# Patient Record
Sex: MALE | Race: WHITE | NOT HISPANIC OR LATINO | Employment: UNEMPLOYED | URBAN - METROPOLITAN AREA
[De-identification: names, ages, dates, MRNs, and addresses within clinical notes are randomized per-mention and may not be internally consistent; named-entity substitution may affect disease eponyms.]

---

## 2017-09-15 ENCOUNTER — TRANSCRIBE ORDERS (OUTPATIENT)
Dept: ADMINISTRATIVE | Facility: HOSPITAL | Age: 62
End: 2017-09-15

## 2017-09-15 DIAGNOSIS — M25.511 RIGHT SHOULDER PAIN, UNSPECIFIED CHRONICITY: Primary | ICD-10-CM

## 2017-09-22 ENCOUNTER — APPOINTMENT (OUTPATIENT)
Dept: GENERAL RADIOLOGY | Facility: HOSPITAL | Age: 62
End: 2017-09-22

## 2017-09-27 ENCOUNTER — APPOINTMENT (OUTPATIENT)
Dept: MRI IMAGING | Facility: HOSPITAL | Age: 62
End: 2017-09-27

## 2017-10-27 ENCOUNTER — HOSPITAL ENCOUNTER (OUTPATIENT)
Dept: MRI IMAGING | Facility: HOSPITAL | Age: 62
Discharge: HOME | End: 2017-10-27

## 2017-10-27 ENCOUNTER — HOSPITAL ENCOUNTER (OUTPATIENT)
Dept: GENERAL RADIOLOGY | Facility: HOSPITAL | Age: 62
Discharge: HOME | End: 2017-10-27
Admitting: PHYSICAL MEDICINE & REHABILITATION

## 2017-10-27 DIAGNOSIS — M25.511 RIGHT SHOULDER PAIN, UNSPECIFIED CHRONICITY: ICD-10-CM

## 2017-10-27 PROCEDURE — 77002 NEEDLE LOCALIZATION BY XRAY: CPT

## 2017-10-27 PROCEDURE — A9577 INJ MULTIHANCE: HCPCS | Performed by: PHYSICIAN ASSISTANT

## 2017-10-27 PROCEDURE — 0 GADOBENATE DIMEGLUMINE 529 MG/ML SOLUTION: Performed by: PHYSICIAN ASSISTANT

## 2017-10-27 PROCEDURE — 73222 MRI JOINT UPR EXTREM W/DYE: CPT

## 2017-10-27 PROCEDURE — 0 IOPAMIDOL 61 % SOLUTION: Performed by: PHYSICIAN ASSISTANT

## 2017-10-27 RX ORDER — LIDOCAINE HYDROCHLORIDE 10 MG/ML
5 INJECTION, SOLUTION INFILTRATION; PERINEURAL ONCE
Status: COMPLETED | OUTPATIENT
Start: 2017-10-27 | End: 2017-10-27

## 2017-10-27 RX ADMIN — GADOBENATE DIMEGLUMINE 1 ML: 529 INJECTION, SOLUTION INTRAVENOUS at 14:55

## 2017-10-27 RX ADMIN — LIDOCAINE HYDROCHLORIDE 5 ML: 10 INJECTION, SOLUTION INFILTRATION; PERINEURAL at 14:55

## 2017-10-27 RX ADMIN — IOPAMIDOL 10 ML: 612 INJECTION, SOLUTION INTRAVENOUS at 14:55

## 2017-11-13 ENCOUNTER — OFFICE VISIT (OUTPATIENT)
Dept: ORTHOPEDIC SURGERY | Facility: CLINIC | Age: 62
End: 2017-11-13

## 2017-11-13 VITALS
HEIGHT: 69 IN | BODY MASS INDEX: 36.29 KG/M2 | DIASTOLIC BLOOD PRESSURE: 89 MMHG | WEIGHT: 245 LBS | HEART RATE: 84 BPM | SYSTOLIC BLOOD PRESSURE: 105 MMHG

## 2017-11-13 DIAGNOSIS — S46.011A RUPTURE OF RIGHT SUPRASPINATUS TENDON, INITIAL ENCOUNTER: Primary | ICD-10-CM

## 2017-11-13 DIAGNOSIS — S43.431A GLENOID LABRUM TEAR, RIGHT, INITIAL ENCOUNTER: ICD-10-CM

## 2017-11-13 DIAGNOSIS — S43.431A SUPERIOR GLENOID LABRUM LESION OF RIGHT SHOULDER, INITIAL ENCOUNTER: ICD-10-CM

## 2017-11-13 PROCEDURE — 99204 OFFICE O/P NEW MOD 45 MIN: CPT | Performed by: ORTHOPAEDIC SURGERY

## 2017-11-13 RX ORDER — OMEPRAZOLE 40 MG/1
CAPSULE, DELAYED RELEASE ORAL
Refills: 0 | COMMUNITY
Start: 2017-10-23 | End: 2022-10-28

## 2017-11-13 RX ORDER — LORATADINE 10 MG/1
TABLET ORAL
Refills: 1 | COMMUNITY
Start: 2017-09-27 | End: 2022-10-28

## 2017-11-13 RX ORDER — CITALOPRAM 20 MG/1
20 TABLET ORAL DAILY
Refills: 0 | COMMUNITY
Start: 2017-11-06

## 2017-11-13 NOTE — PROGRESS NOTES
[facility] Note    Subjective     Chief Complaint   Patient presents with   • Right Shoulder - Pain        HPI      Bolivar Caldwell is a 62 y.o. male.  The patient injured his shoulder 2 months ago.  This is a worker's comp injury.  This pain is worse with motion and better with rest.  Pain is 3-7 out of 10.  It is dull.        History reviewed. No pertinent past medical history.   Past Surgical History:   Procedure Laterality Date   • FOOT SURGERY Left    • KNEE SURGERY Bilateral    • MASTOID SURGERY Right    • SHOULDER SURGERY Right       Family History   Problem Relation Age of Onset   • Cancer Father      Social History     Social History   • Marital status:      Spouse name: N/A   • Number of children: N/A   • Years of education: N/A     Occupational History   • Not on file.     Social History Main Topics   • Smoking status: Never Smoker   • Smokeless tobacco: Never Used   • Alcohol use Yes      Comment: occasional   • Drug use: No   • Sexual activity: Defer     Other Topics Concern   • Not on file     Social History Narrative   • No narrative on file      No current outpatient prescriptions on file prior to visit.     No current facility-administered medications on file prior to visit.       Allergies   Allergen Reactions   • Nubain [Nalbuphine]      Dry heave &  passes out        The following portions of the patient's history were reviewed and updated as appropriate: allergies, current medications, past family history, past medical history, past social history, past surgical history and problem list.    Review of Systems   Constitutional: Negative.    HENT: Negative.    Eyes: Negative.    Respiratory: Positive for apnea and choking.    Cardiovascular: Positive for leg swelling.   Gastrointestinal: Negative.         Acid reflux- taking meds    Endocrine: Negative.    Genitourinary: Negative.    Musculoskeletal: Positive for arthralgias, back pain and neck stiffness.   Skin:  "Negative.    Allergic/Immunologic: Positive for environmental allergies.   Neurological: Negative.    Hematological: Negative.    Psychiatric/Behavioral: Negative.         Objective      Physical Exam  /89  Pulse 84  Ht 68.5\" (174 cm)  Wt 245 lb (111 kg)  BMI 36.71 kg/m2    Body mass index is 36.71 kg/(m^2).        GENERAL APPEARANCE: awake, alert & oriented x 3, in no acute distress and well developed, well nourished  PSYCH: normal mood andaffect  LUNGS:  breathing nonlabored, no wheezing  EYES: sclera anicteric, pupils equal  CARDIOVASCULAR: palpable pulses dorsalis pedis, palpable posterior tibial bilaterally. Capillary refill less than 2 seconds  INTEGUMENTARY: skin intact, no clubbing, cyanosis  NEUROLOGIC:  Normal gait and balance            Ortho Exam  Musculoskeletal   Upper Extremity   Right Shoulder     Inspection and Palpation:     Tenderness - none    Crepitus - none    Sensation is normal    Examination reveals no ecchymosis.      Strength and Tone:    Supraspinatus - 5/5    Infraspinatus - 5/5    Subscapularis - 5/5    Deltoid - 5/5     Range of Motion   Left shoulder:    Internal Rotation: ROM - T7    External Rotation: AROM - 80 degrees    Elevation through flexion: AROM - 180 degrees    Right Shoulder:    Internal Rotation: ROM - T7    External Rotation: AROM - 80 degrees    Elevation through flexion: AROM - 180 degrees     Abduction -180 degrees     Instability   Right shoulder    Sulcus sign negative    Apprehension test negative    Francois relocation test negative    Jerk test negative   Impingement   Right shoulder    Dorado impingement test positive    Neer impingement test positive   Functional Testing   Right shoulder    AC crossover adduction test negative    Abdominal compression test negative    Lift-off sign negative    Speed's test negative    Castañeda's test negative    Horriblower's sign negative       Imaging/Studies  Imaging Results (last 24 hours)     ** No results found for " the last 24 hours. **        I reviewed his MRI right shoulder shows a full-thickness supraspinatus tear partial infraspinatus tear and he has a pan labral tear superior labrum anterior and posterior  Assessment/Plan      Bolivar was seen today for pain.    Diagnoses and all orders for this visit:    Rupture of right supraspinatus tendon, initial encounter    Superior glenoid labrum lesion of right shoulder, initial encounter    Glenoid labrum tear, right, initial encounter      I recommend right shoulder arthroscopy with rotator cuff repair and labrum repair.Treatment options and alternatives were discussed with patient.  Surgical versus non surgical treatment options were discussed as well.    We reviewed the nature of the planned procedure including the risks, benefits and potential complications.  Understanding was expressed and the decision was made to proceed with surgery.      Medical Decision Making  Management Options : over-the-counter medicine and major surgery with risk factors  Data/Risk: radiology tests and independent visualization of imaging, lab tests, or EMG/NCV    Valente Conde MD  11/13/17  8:28 AM

## 2017-12-12 ENCOUNTER — OUTSIDE FACILITY SERVICE (OUTPATIENT)
Dept: ORTHOPEDIC SURGERY | Facility: CLINIC | Age: 62
End: 2017-12-12

## 2017-12-12 PROCEDURE — 29823 SHO ARTHRS SRG XTNSV DBRDMT: CPT | Performed by: ORTHOPAEDIC SURGERY

## 2017-12-12 PROCEDURE — 29827 SHO ARTHRS SRG RT8TR CUF RPR: CPT | Performed by: ORTHOPAEDIC SURGERY

## 2017-12-20 ENCOUNTER — OFFICE VISIT (OUTPATIENT)
Dept: ORTHOPEDIC SURGERY | Facility: CLINIC | Age: 62
End: 2017-12-20

## 2017-12-20 DIAGNOSIS — S46.011D RUPTURE OF RIGHT SUPRASPINATUS TENDON, SUBSEQUENT ENCOUNTER: Primary | ICD-10-CM

## 2017-12-20 DIAGNOSIS — S43.431D SUPERIOR GLENOID LABRUM LESION OF RIGHT SHOULDER, SUBSEQUENT ENCOUNTER: ICD-10-CM

## 2017-12-20 DIAGNOSIS — Z98.890 STATUS POST ARTHROSCOPY OF SHOULDER: ICD-10-CM

## 2017-12-20 PROCEDURE — 99024 POSTOP FOLLOW-UP VISIT: CPT | Performed by: ORTHOPAEDIC SURGERY

## 2017-12-20 RX ORDER — ACYCLOVIR 400 MG/1
TABLET ORAL
COMMUNITY
Start: 2017-11-30 | End: 2022-10-28

## 2017-12-20 NOTE — PROGRESS NOTES
Chief Complaint   Patient presents with   • Post-op     1 week- Rt shoulder arthroscopy with RCR           HPI  His complaint of nausea from the pain medicine and hallucinationsand he also has a sore throat      There were no vitals filed for this visit.      Physical Exam:    The shoulder portals look good he is neurovascular intact        Bolivar was seen today for post-op.    Diagnoses and all orders for this visit:    Rupture of right supraspinatus tendon, subsequent encounter    Superior glenoid labrum lesion of right shoulder, subsequent encounter    Status post arthroscopy of shoulder      He is seen a dentist today for his throat and bite alignment.  He'll follow up in 3 weeks and start physical therapy.  He will return to work January 3 no use right arm

## 2018-01-10 ENCOUNTER — OFFICE VISIT (OUTPATIENT)
Dept: ORTHOPEDIC SURGERY | Facility: CLINIC | Age: 63
End: 2018-01-10

## 2018-01-10 DIAGNOSIS — S46.011D RUPTURE OF RIGHT SUPRASPINATUS TENDON, SUBSEQUENT ENCOUNTER: Primary | ICD-10-CM

## 2018-01-10 DIAGNOSIS — Z98.890 STATUS POST ARTHROSCOPY OF SHOULDER: ICD-10-CM

## 2018-01-10 PROCEDURE — 99024 POSTOP FOLLOW-UP VISIT: CPT | Performed by: ORTHOPAEDIC SURGERY

## 2018-01-10 NOTE — PROGRESS NOTES
Chief Complaint   Patient presents with   • Post-op Follow-up     Debridement of anterior and posterior and superior labral tears   • Post-op     4 weeks , 12/12/17 Right shoulder arthrosopy with rotator cuff repair of the supraspinatus using Arthrex SpeedBridge           HPI  He is doing well no complaints.      There were no vitals filed for this visit.      Physical Exam:  His right shoulder looks good no sign of infection and neurovascular intact        Bolivar was seen today for post-op follow-up and post-op.    Diagnoses and all orders for this visit:    Rupture of right supraspinatus tendon, subsequent encounter  -     Ambulatory Referral to Physical Therapy    Status post arthroscopy of shoulder  -     Ambulatory Referral to Physical Therapy    He will start physical therapy and follow-up in one month.  He is on restrictions of no use right arm

## 2018-01-16 ENCOUNTER — TREATMENT (OUTPATIENT)
Dept: PHYSICAL THERAPY | Facility: CLINIC | Age: 63
End: 2018-01-16

## 2018-01-16 DIAGNOSIS — S46.011D RUPTURE OF RIGHT SUPRASPINATUS TENDON, SUBSEQUENT ENCOUNTER: ICD-10-CM

## 2018-01-16 DIAGNOSIS — Z98.890 STATUS POST ARTHROSCOPY OF SHOULDER: Primary | ICD-10-CM

## 2018-01-16 PROCEDURE — 97140 MANUAL THERAPY 1/> REGIONS: CPT | Performed by: PHYSICAL THERAPIST

## 2018-01-16 PROCEDURE — 97110 THERAPEUTIC EXERCISES: CPT | Performed by: PHYSICAL THERAPIST

## 2018-01-16 PROCEDURE — 97001 PR PHYS THERAPY EVALUATION: CPT | Performed by: PHYSICAL THERAPIST
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
## 2018-02-07 ENCOUNTER — TREATMENT (OUTPATIENT)
Dept: PHYSICAL THERAPY | Facility: CLINIC | Age: 63
End: 2018-02-07

## 2018-02-07 DIAGNOSIS — Z98.890 STATUS POST ARTHROSCOPY OF SHOULDER: Primary | ICD-10-CM

## 2018-02-07 PROCEDURE — 97110 THERAPEUTIC EXERCISES: CPT | Performed by: PHYSICAL THERAPIST

## 2018-02-07 PROCEDURE — 97140 MANUAL THERAPY 1/> REGIONS: CPT | Performed by: PHYSICAL THERAPIST

## 2018-02-07 NOTE — PROGRESS NOTES
Physical Therapy Daily Progress Note    TOTAL TIME:  55 MINUTES    Bolivar Caldwell reports: patient returns today for the first time since 1/16 initial evaluation; he has a sick family member out of state that he has been visiting, but has been doing his HEP at home;      Objective   See Exercise, Manual, and Modality Logs for complete treatment.     THERAPEUTIC EXERCISES/ACTIVITIES ADDED TODAY: see flow sheets    Assessment/Plan GOOD PROM TODAY; ENCOURAGED PATIENT TO AVOID EXCESSIVE AROM OR LIFTING AT THIS TIME SECONDARY TO PROTOCOL DURING THE PROTECTIVE PHASE; PATIENT IS TYPE A PERSONALITY AND SHOULD TAKE CARE AND CAUTION TO OVERDO IT;   PATIENT NEEDS CONTINUED PHYSICAL THERAPY IN ORDER TO ACHIEVE FULL ROM, STRENGTH AND FUNCTION WITH NO REPORTS OF PAIN IN ORDER TO RTW WITHOUT RESTRICTIONS AND WITHOUT PAIN OR DYSFUNCTION       Progress per Plan of Care and Progress strengthening /stabilization /functional activity           Manual Therapy:    25     mins  81742;  Therapeutic Exercise:    15     mins  72506;     Neuromuscular Noah:        mins  19630;    Therapeutic Activity:          mins  60512;     Gait Training:           mins  26068;     Ultrasound:          mins  58830;    Electrical Stimulation:         mins  73206 ( );  Dry Needling          mins self-pay    Timed Treatment:   40   mins   Total Treatment:     55   mins    Be Hodgson, PT  Physical Therapist

## 2018-02-09 ENCOUNTER — OFFICE VISIT (OUTPATIENT)
Dept: ORTHOPEDIC SURGERY | Facility: CLINIC | Age: 63
End: 2018-02-09

## 2018-02-09 DIAGNOSIS — S46.011D RUPTURE OF RIGHT SUPRASPINATUS TENDON, SUBSEQUENT ENCOUNTER: ICD-10-CM

## 2018-02-09 DIAGNOSIS — Z98.890 STATUS POST ARTHROSCOPY OF SHOULDER: Primary | ICD-10-CM

## 2018-02-09 PROCEDURE — 99024 POSTOP FOLLOW-UP VISIT: CPT | Performed by: ORTHOPAEDIC SURGERY

## 2018-02-09 NOTE — PROGRESS NOTES
Chief Complaint   Patient presents with   • Right Shoulder - Follow-up     1 month f/u  2 month post 12/12/17 Right shoulder arthrosopy with rotator cuff repair of the supraspinatus using Arthrex SpeedBridge           HPI    Is doing great with no complaints other than occasional pain.  Last lites first time he had take a pain pill.  He has full motion with 4 out of 5 strength.  He is neurovascular intact.    There were no vitals filed for this visit.      Physical Exam:        Bolivar was seen today for follow-up.    Diagnoses and all orders for this visit:    Status post arthroscopy of shoulder  -     Ambulatory Referral to Physical Therapy    Rupture of right supraspinatus tendon, subsequent encounter  -     Ambulatory Referral to Physical Therapy    He will continue physical therapy and start strengthening March 12.  He is work restriction no lifting right arm.

## 2018-02-13 ENCOUNTER — TREATMENT (OUTPATIENT)
Dept: PHYSICAL THERAPY | Facility: CLINIC | Age: 63
End: 2018-02-13

## 2018-02-13 DIAGNOSIS — Z98.890 STATUS POST ARTHROSCOPY OF SHOULDER: Primary | ICD-10-CM

## 2018-02-13 PROCEDURE — 97110 THERAPEUTIC EXERCISES: CPT | Performed by: PHYSICAL THERAPIST

## 2018-02-13 PROCEDURE — 97140 MANUAL THERAPY 1/> REGIONS: CPT | Performed by: PHYSICAL THERAPIST

## 2018-02-13 NOTE — PROGRESS NOTES
Physical Therapy Daily Progress Note    TOTAL TIME: 75 MINUTES    Bolivar Caldwell reports: shoulder is feeling good, a little tight at times; saw Dr. Conde last week and he said everything is looking good      Objective   See Exercise, Manual, and Modality Logs for complete treatment.     THERAPEUTIC EXERCISES/ACTIVITIES ADDED TODAY: shoulder isometrics for flexion, ER, ADD and IR    Assessment/Plan  PATIENT NEEDS CONTINUED PHYSICAL THERAPY IN ORDER TO ACHIEVE FULL ROM, STRENGTH AND FUNCTION WITH NO REPORTS OF PAIN IN ORDER TO RTW WITHOUT RESTRICTIONS AND WITHOUT PAIN OR DYSFUNCTION       Progress per Plan of Care           Manual Therapy:    15     mins  28184;  Therapeutic Exercise:    50     mins  56696;     Neuromuscular Noah:        mins  89480;    Therapeutic Activity:          mins  00251;     Gait Training:           mins  76314;     Ultrasound:          mins  63294;    Electrical Stimulation:         mins  89130 ( );  Dry Needling          mins self-pay    Timed Treatment:   65   mins   Total Treatment:     75   mins    Be Hodgson, PT  Physical Therapist

## 2018-02-15 ENCOUNTER — TREATMENT (OUTPATIENT)
Dept: PHYSICAL THERAPY | Facility: CLINIC | Age: 63
End: 2018-02-15

## 2018-02-15 DIAGNOSIS — Z98.890 STATUS POST ARTHROSCOPY OF SHOULDER: Primary | ICD-10-CM

## 2018-02-15 PROCEDURE — 97140 MANUAL THERAPY 1/> REGIONS: CPT | Performed by: PHYSICAL THERAPIST

## 2018-02-15 PROCEDURE — 97110 THERAPEUTIC EXERCISES: CPT | Performed by: PHYSICAL THERAPIST

## 2018-02-15 NOTE — PROGRESS NOTES
Physical Therapy Daily Progress Note    TOTAL TIME: 60 MINUTES    Bolivar Caldwell reports: no new complaints; still pretty sore at night when trying to sleep      Objective   See Exercise, Manual, and Modality Logs for complete treatment.     THERAPEUTIC EXERCISES/ACTIVITIES ADDED TODAY: UBE    Assessment/Plan  PATIENT NEEDS CONTINUED PHYSICAL THERAPY IN ORDER TO ACHIEVE FULL ROM, STRENGTH AND FUNCTION WITH NO REPORTS OF PAIN IN ORDER TO RTW WITHOUT RESTRICTIONS AND WITHOUT PAIN OR DYSFUNCTION       Progress per Plan of Care and Progress strengthening /stabilization /functional activity           Manual Therapy:    15     mins  56545;  Therapeutic Exercise:    45     mins  48672;     Neuromuscular Noah:        mins  50745;    Therapeutic Activity:          mins  90548;     Gait Training:           mins  83915;     Ultrasound:          mins  52807;    Electrical Stimulation:         mins  42107 ( );  Dry Needling          mins self-pay    Timed Treatment:   60   mins   Total Treatment:     60   mins    Be Hodgson, PT  Physical Therapist

## 2018-02-19 ENCOUNTER — TREATMENT (OUTPATIENT)
Dept: PHYSICAL THERAPY | Facility: CLINIC | Age: 63
End: 2018-02-19

## 2018-02-19 DIAGNOSIS — Z98.890 STATUS POST ARTHROSCOPY OF SHOULDER: Primary | ICD-10-CM

## 2018-02-19 PROCEDURE — 97140 MANUAL THERAPY 1/> REGIONS: CPT | Performed by: PHYSICAL THERAPIST

## 2018-02-19 PROCEDURE — 97110 THERAPEUTIC EXERCISES: CPT | Performed by: PHYSICAL THERAPIST

## 2018-02-19 NOTE — PROGRESS NOTES
Physical Therapy Daily Progress Note    TOTAL TIME: 65 MINUTES    Bolivar Caldwell reports: shoulder sore at night; able to do a little more with it      Objective   See Exercise, Manual, and Modality Logs for complete treatment.     THERAPEUTIC EXERCISES/ACTIVITIES ADDED TODAY: light IR stx    Assessment/Plan  PATIENT NEEDS CONTINUED PHYSICAL THERAPY IN ORDER TO ACHIEVE FULL ROM, STRENGTH AND FUNCTION WITH NO REPORTS OF PAIN IN ORDER TO RTW WITHOUT RESTRICTIONS AND WITHOUT PAIN OR DYSFUNCTION       Progress per Plan of Care           Manual Therapy:    25     mins  54482;  Therapeutic Exercise:    30     mins  47906;     Neuromuscular Noah:        mins  68179;    Therapeutic Activity:          mins  55727;     Gait Training:           mins  01922;     Ultrasound:          mins  29474;    Electrical Stimulation:         mins  77471 ( );  Dry Needling          mins self-pay    Timed Treatment:   55   mins   Total Treatment:     65   mins    Be Hodgson PT  Physical Therapist

## 2018-02-22 ENCOUNTER — TREATMENT (OUTPATIENT)
Dept: PHYSICAL THERAPY | Facility: CLINIC | Age: 63
End: 2018-02-22

## 2018-02-22 DIAGNOSIS — Z98.890 STATUS POST ARTHROSCOPY OF SHOULDER: Primary | ICD-10-CM

## 2018-02-22 PROCEDURE — 97140 MANUAL THERAPY 1/> REGIONS: CPT | Performed by: PHYSICAL THERAPIST

## 2018-02-22 PROCEDURE — 97110 THERAPEUTIC EXERCISES: CPT | Performed by: PHYSICAL THERAPIST

## 2018-02-22 NOTE — PROGRESS NOTES
Physical Therapy Daily Progress Note    TOTAL TIME: 75 MINUTES    Bolivar Rosenthalclaricecaroline reports: shoulder gets really sore at night but I have a very busy schedule, not resting it much at all      Objective   See Exercise, Manual, and Modality Logs for complete treatment.     THERAPEUTIC EXERCISES/ACTIVITIES ADDED TODAY: n/a    Assessment/Plan  PATIENT NEEDS CONTINUED PHYSICAL THERAPY IN ORDER TO ACHIEVE FULL ROM, STRENGTH AND FUNCTION WITH NO REPORTS OF PAIN IN ORDER TO RTW WITHOUT RESTRICTIONS AND WITHOUT PAIN OR DYSFUNCTION       Progress per Plan of Care and Progress strengthening /stabilization /functional activity           Manual Therapy:    20     mins  16491;  Therapeutic Exercise:    40     mins  36282;     Neuromuscular Noah:        mins  80056;    Therapeutic Activity:          mins  29580;     Gait Training:           mins  24777;     Ultrasound:          mins  85980;    Electrical Stimulation:         mins  54139 ( );  Dry Needling          mins self-pay    Timed Treatment:   60   mins   Total Treatment:     75   mins    Be Hodgson PT  Physical Therapist

## 2018-03-02 ENCOUNTER — TREATMENT (OUTPATIENT)
Dept: PHYSICAL THERAPY | Facility: CLINIC | Age: 63
End: 2018-03-02

## 2018-03-02 DIAGNOSIS — Z98.890 STATUS POST ARTHROSCOPY OF SHOULDER: Primary | ICD-10-CM

## 2018-03-02 PROCEDURE — 97140 MANUAL THERAPY 1/> REGIONS: CPT | Performed by: PHYSICAL THERAPIST

## 2018-03-02 PROCEDURE — 97110 THERAPEUTIC EXERCISES: CPT | Performed by: PHYSICAL THERAPIST

## 2018-03-02 NOTE — PROGRESS NOTES
Physical Therapy Daily Progress Note    Subjective   Bolivar Caldwell reports that he is feeling good, he doesn't really have any pain at rest, only when he moves into extremes of ROM.     Objective   See Exercise, Manual, and Modality Logs for complete treatment.       Assessment/Plan      Pt has very good PROM of the right shoulder. He was able to do all activities in the clinic without exacerbation of symptoms. Shoulder be appropriate for progression to strengthening next week.     Progress per Plan of Care and Progress strengthening /stabilization /functional activity           Manual Therapy:    20     mins  11293;  Therapeutic Exercise:    36     mins  31077;     Neuromuscular Noah:        mins  43560;    Therapeutic Activity:          mins  33966;     Gait Training:           mins  50721;     Ultrasound:          mins  92840;    Electrical Stimulation:         mins  22017 ( );  Dry Needling          mins self-pay    Timed Treatment:   56   mins   Total Treatment:     70   mins    Shane Charles PT  Physical Therapist

## 2018-03-05 ENCOUNTER — TREATMENT (OUTPATIENT)
Dept: PHYSICAL THERAPY | Facility: CLINIC | Age: 63
End: 2018-03-05

## 2018-03-05 DIAGNOSIS — Z98.890 STATUS POST ARTHROSCOPY OF SHOULDER: Primary | ICD-10-CM

## 2018-03-05 PROCEDURE — 97140 MANUAL THERAPY 1/> REGIONS: CPT | Performed by: PHYSICAL THERAPIST

## 2018-03-05 PROCEDURE — 97110 THERAPEUTIC EXERCISES: CPT | Performed by: PHYSICAL THERAPIST

## 2018-03-05 NOTE — PROGRESS NOTES
Physical Therapy Daily Progress Note    TOTAL TIME: 75 MINUTES    Bolivar Caldwell reports: minimal to no pain during the day, but have a lot of pain at night when trying to sleep      Objective   See Exercise, Manual, and Modality Logs for complete treatment.     THERAPEUTIC EXERCISES/ACTIVITIES ADDED TODAY: see flow sheets    Assessment/Plan  PATIENT NEEDS CONTINUED PHYSICAL THERAPY IN ORDER TO ACHIEVE FULL ROM, STRENGTH AND FUNCTION WITH NO REPORTS OF PAIN IN ORDER TO RTW WITHOUT RESTRICTIONS AND WITHOUT PAIN OR DYSFUNCTION       Progress per Plan of Care and Progress strengthening /stabilization /functional activity           Manual Therapy:    20     mins  12171;  Therapeutic Exercise:    40     mins  27881;     Neuromuscular Noah:        mins  68991;    Therapeutic Activity:          mins  82472;     Gait Training:           mins  36446;     Ultrasound:          mins  98136;    Electrical Stimulation:         mins  12653 ( );  Dry Needling          mins self-pay    Timed Treatment:   60   mins   Total Treatment:     75   mins    Be Hodgson PT  Physical Therapist

## 2018-03-08 ENCOUNTER — TREATMENT (OUTPATIENT)
Dept: PHYSICAL THERAPY | Facility: CLINIC | Age: 63
End: 2018-03-08

## 2018-03-08 DIAGNOSIS — Z98.890 STATUS POST ARTHROSCOPY OF SHOULDER: Primary | ICD-10-CM

## 2018-03-08 PROCEDURE — 97110 THERAPEUTIC EXERCISES: CPT | Performed by: PHYSICAL THERAPIST

## 2018-03-08 PROCEDURE — 97140 MANUAL THERAPY 1/> REGIONS: CPT | Performed by: PHYSICAL THERAPIST

## 2018-03-08 NOTE — PROGRESS NOTES
Physical Therapy Daily Progress Note    TOTAL TIME: 90 MINUTES    Bolivar Caldwell reports: shoulder has been feeling better, still sore at night      Objective   See Exercise, Manual, and Modality Logs for complete treatment.     THERAPEUTIC EXERCISES/ACTIVITIES ADDED TODAY: added dumb bell exercises per flow sheets    Assessment/Plan  PATIENT NEEDS CONTINUED PHYSICAL THERAPY IN ORDER TO ACHIEVE FULL ROM, STRENGTH AND FUNCTION WITH NO REPORTS OF PAIN IN ORDER TO RTW WITHOUT RESTRICTIONS AND WITHOUT PAIN OR DYSFUNCTION       Progress per Plan of Care and Progress strengthening /stabilization /functional activity           Manual Therapy:    20     mins  19275;  Therapeutic Exercise:    55     mins  60170;     Neuromuscular Noah:        mins  36227;    Therapeutic Activity:          mins  74530;     Gait Training:           mins  14579;     Ultrasound:          mins  76875;    Electrical Stimulation:         mins  04597 ( );  Dry Needling          mins self-pay    Timed Treatment:   75   mins   Total Treatment:     90   mins    Be Hodgson, PT  Physical Therapist

## 2018-03-12 ENCOUNTER — TREATMENT (OUTPATIENT)
Dept: PHYSICAL THERAPY | Facility: CLINIC | Age: 63
End: 2018-03-12

## 2018-03-12 DIAGNOSIS — Z98.890 STATUS POST ARTHROSCOPY OF SHOULDER: Primary | ICD-10-CM

## 2018-03-12 PROCEDURE — 97014 ELECTRIC STIMULATION THERAPY: CPT | Performed by: PHYSICAL THERAPIST

## 2018-03-12 PROCEDURE — 97110 THERAPEUTIC EXERCISES: CPT | Performed by: PHYSICAL THERAPIST

## 2018-03-12 NOTE — PROGRESS NOTES
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     THERAPEUTIC EXERCISES/ACTIVITIES ADDED TODAY: green tband pulls, ADD, flexion    Assessment/Plan  PATIENT NEEDS CONTINUED PHYSICAL THERAPY IN ORDER TO ACHIEVE FULL ROM, STRENGTH AND FUNCTION WITH NO REPORTS OF PAIN IN ORDER TO RTW WITHOUT RESTRICTIONS AND WITHOUT PAIN OR DYSFUNCTION       Progress per Plan of Care           Manual Therapy:    20     mins  37992;  Therapeutic Exercise:    45     mins  84453;     Neuromuscular Noah:        mins  84091;    Therapeutic Activity:          mins  72059;     Gait Training:           mins  27836;     Ultrasound:          mins  83665;    Electrical Stimulation:         mins  79555 ( );  Dry Needling          mins self-pay    Timed Treatment:   65   mins   Total Treatment:     80   mins    Be Hodgson, PT  Physical Therapist

## 2018-03-15 ENCOUNTER — TREATMENT (OUTPATIENT)
Dept: PHYSICAL THERAPY | Facility: CLINIC | Age: 63
End: 2018-03-15

## 2018-03-15 DIAGNOSIS — Z98.890 STATUS POST ARTHROSCOPY OF SHOULDER: Primary | ICD-10-CM

## 2018-03-15 PROCEDURE — 97140 MANUAL THERAPY 1/> REGIONS: CPT | Performed by: PHYSICAL THERAPIST

## 2018-03-15 PROCEDURE — 97110 THERAPEUTIC EXERCISES: CPT | Performed by: PHYSICAL THERAPIST

## 2018-03-15 NOTE — PROGRESS NOTES
"Physical Therapy Daily Progress Note    TOTAL TIME: 80 MINUTES    Bolivar Caldwell reports: shoulder is feeling really good; still pain at night when trying to sleep      Objective   See Exercise, Manual, and Modality Logs for complete treatment.     THERAPEUTIC EXERCISES/ACTIVITIES ADDED TODAY: see flow sheets    Assessment/Plan  Patient is progressing very well through RTC repair protocol; working very hard and is very consistent with therapy; continue to educate patient to not \"over-do it\" outside of therapy as he is admittedly very type A; PROM is coming along very nicely, can AROM through functional ROM without compensation    Progress per Plan of Care           Manual Therapy:    20     mins  27344;  Therapeutic Exercise:    45     mins  68140;     Neuromuscular Noah:        mins  59700;    Therapeutic Activity:          mins  84609;     Gait Training:           mins  47357;     Ultrasound:          mins  29747;    Electrical Stimulation:         mins  85296 ( );  Dry Needling          mins self-pay    Timed Treatment:   65   mins   Total Treatment:     80   mins    Be Hodgson, PT  Physical Therapist  "

## 2018-03-16 ENCOUNTER — OFFICE VISIT (OUTPATIENT)
Dept: ORTHOPEDIC SURGERY | Facility: CLINIC | Age: 63
End: 2018-03-16

## 2018-03-16 DIAGNOSIS — Z98.890 STATUS POST RIGHT ROTATOR CUFF REPAIR: Primary | ICD-10-CM

## 2018-03-16 PROCEDURE — 99024 POSTOP FOLLOW-UP VISIT: CPT | Performed by: ORTHOPAEDIC SURGERY

## 2018-03-16 NOTE — PROGRESS NOTES
Chief Complaint   Patient presents with   • Post-op     1 month: 12 weeks s/p Right shoulder arthrosopy with rotator cuff repair of the supraspinatus using Arthrex SpeedBridge 12/12/17           HPI    He is doing well with occasional night pain.  He is 3 months status post right shoulder cuff repair.    There were no vitals filed for this visit.      Physical Exam:    Has full range of motion but appropriate weakness.          ICD-10-CM ICD-9-CM   1. Status post right rotator cuff repair Z98.890 V45.89       He will continue physical therapy.  His work restriction is no lifting right arm.  He will follow-up in one month.

## 2018-03-20 ENCOUNTER — TREATMENT (OUTPATIENT)
Dept: PHYSICAL THERAPY | Facility: CLINIC | Age: 63
End: 2018-03-20

## 2018-03-20 DIAGNOSIS — Z98.890 STATUS POST ARTHROSCOPY OF SHOULDER: Primary | ICD-10-CM

## 2018-03-20 PROCEDURE — 97140 MANUAL THERAPY 1/> REGIONS: CPT | Performed by: PHYSICAL THERAPIST

## 2018-03-20 PROCEDURE — 97110 THERAPEUTIC EXERCISES: CPT | Performed by: PHYSICAL THERAPIST

## 2018-03-20 NOTE — PROGRESS NOTES
Physical Therapy Daily Progress Note    TOTAL TIME: 80 MINUTES    Bolivar Caldwell reports: saw Dr. Conde, he was pleased with progress to this point, said I just need to continue progressing with therapy      Objective   See Exercise, Manual, and Modality Logs for complete treatment.     THERAPEUTIC EXERCISES/ACTIVITIES ADDED TODAY: TRX rows, casting, slap shot    Assessment/Plan  PATIENT NEEDS CONTINUED PHYSICAL THERAPY IN ORDER TO ACHIEVE FULL ROM, STRENGTH AND FUNCTION WITH NO REPORTS OF PAIN IN ORDER TO RTW WITHOUT RESTRICTIONS AND WITHOUT PAIN OR DYSFUNCTION       Progress per Plan of Care           Manual Therapy:    20     mins  19193;  Therapeutic Exercise:    45     mins  73789;     Neuromuscular Noah:        mins  65501;    Therapeutic Activity:          mins  55075;     Gait Training:           mins  44689;     Ultrasound:          mins  91668;    Electrical Stimulation:         mins  61216 ( );  Dry Needling          mins self-pay    Timed Treatment:   65   mins   Total Treatment:     80   mins    Be Hodgson, PT  Physical Therapist

## 2018-03-22 ENCOUNTER — TREATMENT (OUTPATIENT)
Dept: PHYSICAL THERAPY | Facility: CLINIC | Age: 63
End: 2018-03-22

## 2018-03-22 DIAGNOSIS — Z98.890 STATUS POST ARTHROSCOPY OF SHOULDER: Primary | ICD-10-CM

## 2018-03-22 PROCEDURE — 97140 MANUAL THERAPY 1/> REGIONS: CPT | Performed by: PHYSICAL THERAPIST

## 2018-03-22 PROCEDURE — 97110 THERAPEUTIC EXERCISES: CPT | Performed by: PHYSICAL THERAPIST

## 2018-03-22 NOTE — PROGRESS NOTES
Physical Therapy Daily Progress Note    TOTAL TIME: 80 MINUTES    Bolivar Goodkarencaroline reports: shoulder feeling better, still sore at night      Objective   See Exercise, Manual, and Modality Logs for complete treatment.     THERAPEUTIC EXERCISES/ACTIVITIES ADDED TODAY: 2# scaption; 2# ball on wall taps    Assessment/Plan  PATIENT NEEDS CONTINUED PHYSICAL THERAPY IN ORDER TO ACHIEVE FULL ROM, STRENGTH AND FUNCTION WITH NO REPORTS OF PAIN IN ORDER TO RTW WITHOUT RESTRICTIONS AND WITHOUT PAIN OR DYSFUNCTION       Progress per Plan of Care           Manual Therapy:    20     mins  09039;  Therapeutic Exercise:    45     mins  50121;     Neuromuscular Noah:        mins  21764;    Therapeutic Activity:          mins  36973;     Gait Training:           mins  44943;     Ultrasound:          mins  31596;    Electrical Stimulation:         mins  05574 ( );  Dry Needling          mins self-pay    Timed Treatment:   65   mins   Total Treatment:     80   mins    Be Hodgson PT  Physical Therapist

## 2018-04-02 ENCOUNTER — TREATMENT (OUTPATIENT)
Dept: PHYSICAL THERAPY | Facility: CLINIC | Age: 63
End: 2018-04-02

## 2018-04-02 DIAGNOSIS — Z98.890 STATUS POST ARTHROSCOPY OF SHOULDER: Primary | ICD-10-CM

## 2018-04-02 PROCEDURE — 97014 ELECTRIC STIMULATION THERAPY: CPT | Performed by: PHYSICAL THERAPIST

## 2018-04-02 PROCEDURE — 97110 THERAPEUTIC EXERCISES: CPT | Performed by: PHYSICAL THERAPIST

## 2018-04-02 NOTE — PROGRESS NOTES
Physical Therapy Daily Progress Note    TOTAL TIME: 85 MINUTES    Bolivar Javi reports: shoulder has been a little sore, may have done too much with it over the weekend      Objective   See Exercise, Manual, and Modality Logs for complete treatment.     THERAPEUTIC EXERCISES/ACTIVITIES ADDED TODAY: blue tband pull aparts, TRX series, 2# scaption    Assessment/Plan  PATIENT NEEDS CONTINUED PHYSICAL THERAPY IN ORDER TO ACHIEVE FULL ROM, STRENGTH AND FUNCTION WITH NO REPORTS OF PAIN IN ORDER TO RTW WITHOUT RESTRICTIONS AND WITHOUT PAIN OR DYSFUNCTION       Progress per Plan of Care           Manual Therapy:    20     mins  03794;  Therapeutic Exercise:    65     mins  92785;     Neuromuscular Noah:        mins  20810;    Therapeutic Activity:          mins  71415;     Gait Training:           mins  35005;     Ultrasound:          mins  21565;    Electrical Stimulation:         mins  32307 ( );  Dry Needling          mins self-pay    Timed Treatment:   85   mins   Total Treatment:     85   mins    Be Hodgson, PT  Physical Therapist

## 2018-04-09 ENCOUNTER — TREATMENT (OUTPATIENT)
Dept: PHYSICAL THERAPY | Facility: CLINIC | Age: 63
End: 2018-04-09

## 2018-04-09 DIAGNOSIS — Z98.890 STATUS POST ARTHROSCOPY OF SHOULDER: Primary | ICD-10-CM

## 2018-04-09 PROCEDURE — 97110 THERAPEUTIC EXERCISES: CPT | Performed by: PHYSICAL THERAPIST

## 2018-04-09 PROCEDURE — 97140 MANUAL THERAPY 1/> REGIONS: CPT | Performed by: PHYSICAL THERAPIST

## 2018-04-09 NOTE — PROGRESS NOTES
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     THERAPEUTIC EXERCISES/ACTIVITIES ADDED TODAY: grey tband one arm row with rotation CKC, 1 arm row with one plate on Universal machine     Assessment/Plan  PATIENT NEEDS CONTINUED PHYSICAL THERAPY IN ORDER TO ACHIEVE FULL ROM, STRENGTH AND FUNCTION WITH NO REPORTS OF PAIN IN ORDER TO RTW WITHOUT RESTRICTIONS AND WITHOUT PAIN OR DYSFUNCTION       Progress per Plan of Care           Manual Therapy:    10     mins  15235;  Therapeutic Exercise:    45     mins  64186;     Neuromuscular Noah:        mins  66415;    Therapeutic Activity:          mins  19718;     Gait Training:           mins  49198;     Ultrasound:          mins  71731;    Electrical Stimulation:         mins  82594 ( );  Dry Needling          mins self-pay    Timed Treatment:   55   mins   Total Treatment:     55   mins    Be Hodgson PT  Physical Therapist

## 2018-04-13 ENCOUNTER — TREATMENT (OUTPATIENT)
Dept: PHYSICAL THERAPY | Facility: CLINIC | Age: 63
End: 2018-04-13

## 2018-04-13 DIAGNOSIS — Z98.890 STATUS POST ARTHROSCOPY OF SHOULDER: Primary | ICD-10-CM

## 2018-04-13 PROCEDURE — 97110 THERAPEUTIC EXERCISES: CPT | Performed by: PHYSICAL THERAPIST

## 2018-04-13 PROCEDURE — 97140 MANUAL THERAPY 1/> REGIONS: CPT | Performed by: PHYSICAL THERAPIST

## 2018-04-16 ENCOUNTER — TREATMENT (OUTPATIENT)
Dept: PHYSICAL THERAPY | Facility: CLINIC | Age: 63
End: 2018-04-16

## 2018-04-16 DIAGNOSIS — Z98.890 STATUS POST ARTHROSCOPY OF SHOULDER: Primary | ICD-10-CM

## 2018-04-16 PROCEDURE — 97140 MANUAL THERAPY 1/> REGIONS: CPT | Performed by: PHYSICAL THERAPIST

## 2018-04-16 PROCEDURE — 97110 THERAPEUTIC EXERCISES: CPT | Performed by: PHYSICAL THERAPIST

## 2018-04-16 NOTE — PROGRESS NOTES
Physical Therapy Daily Progress Note    TOTAL TIME: 75 MINUTES    Bolivar Javi reports: shoulder is feeling better and better; will f/u with the MD next week      Objective   See Exercise, Manual, and Modality Logs for complete treatment.     THERAPEUTIC EXERCISES/ACTIVITIES ADDED TODAY: see flow sheets    Assessment/Plan  PATIENT NEEDS CONTINUED PHYSICAL THERAPY IN ORDER TO ACHIEVE FULL ROM, STRENGTH AND FUNCTION WITH NO REPORTS OF PAIN IN ORDER TO RTW WITHOUT RESTRICTIONS AND WITHOUT PAIN OR DYSFUNCTION       Progress per Plan of Care           Manual Therapy:    15     mins  80514;  Therapeutic Exercise:    50     mins  83817;     Neuromuscular Noah:        mins  46323;    Therapeutic Activity:          mins  45582;     Gait Training:           mins  19580;     Ultrasound:          mins  53533;    Electrical Stimulation:         mins  53861 ( );  Dry Needling          mins self-pay    Timed Treatment:   65   mins   Total Treatment:     75   mins    Be Hodgson PT  Physical Therapist

## 2018-04-17 NOTE — PROGRESS NOTES
Physical Therapy Daily Progress Note    TOTAL TIME: 75 MINUTES    Bolivar Goodkarencaroline reports: shoulder is feeling a lot better as of late; still pain when sleeping at night, but overall much better; hoping to be released back to full work duty at next MD visit      Objective   See Exercise, Manual, and Modality Logs for complete treatment.     THERAPEUTIC EXERCISES/ACTIVITIES ADDED TODAY: see flow sheets    Assessment/Plan  PATIENT NEEDS CONTINUED PHYSICAL THERAPY IN ORDER TO ACHIEVE FULL ROM, STRENGTH AND FUNCTION WITH NO REPORTS OF PAIN IN ORDER TO RTW WITHOUT RESTRICTIONS AND WITHOUT PAIN OR DYSFUNCTION       Progress per Plan of Care and Progress strengthening /stabilization /functional activity           Manual Therapy:    10     mins  21952;  Therapeutic Exercise:    65     mins  34438;     Neuromuscular Noah:        mins  16258;    Therapeutic Activity:          mins  03292;     Gait Training:           mins  57871;     Ultrasound:          mins  41661;    Electrical Stimulation:         mins  20815 ( );  Dry Needling          mins self-pay    Timed Treatment:   75   mins   Total Treatment:     75   mins    Be Hodgson, PT  Physical Therapist

## 2018-04-20 ENCOUNTER — OFFICE VISIT (OUTPATIENT)
Dept: ORTHOPEDIC SURGERY | Facility: CLINIC | Age: 63
End: 2018-04-20

## 2018-04-20 VITALS
HEIGHT: 69 IN | DIASTOLIC BLOOD PRESSURE: 92 MMHG | SYSTOLIC BLOOD PRESSURE: 129 MMHG | HEART RATE: 77 BPM | BODY MASS INDEX: 36.24 KG/M2 | WEIGHT: 244.71 LBS

## 2018-04-20 DIAGNOSIS — Z98.890 STATUS POST RIGHT ROTATOR CUFF REPAIR: Primary | ICD-10-CM

## 2018-04-20 PROCEDURE — 99212 OFFICE O/P EST SF 10 MIN: CPT | Performed by: ORTHOPAEDIC SURGERY

## 2018-04-20 NOTE — PROGRESS NOTES
[facility]    Subjective     Chief Complaint   Patient presents with   • Right Shoulder - Follow-up     17 weeks s/p Right shoulder arthrosopy with rotator cuff repair of the supraspinatus using Arthrex SpeedBridge 12/12/17        Hospitals in [place]      Bolivar Caldwell is a 62 y.o. male.  He is 4 months follow-up right shoulder cuff repair doing well without complaint.        History reviewed. No pertinent past medical history.   Past Surgical History:   Procedure Laterality Date   • FOOT SURGERY Left    • KNEE SURGERY Bilateral    • MASTOID SURGERY Right    • SHOULDER SURGERY Right       Family History   Problem Relation Age of Onset   • Cancer Father      Social History     Social History   • Marital status:      Spouse name: N/A   • Number of children: N/A   • Years of education: N/A     Occupational History   • Not on file.     Social History Main Topics   • Smoking status: Never Smoker   • Smokeless tobacco: Never Used   • Alcohol use Yes      Comment: occasional   • Drug use: No   • Sexual activity: Defer     Other Topics Concern   • Not on file     Social History Narrative   • No narrative on file      Current Outpatient Prescriptions on File Prior to Visit   Medication Sig Dispense Refill   • acyclovir (ZOVIRAX) 400 MG tablet      • citalopram (CeleXA) 20 MG tablet   0   • loratadine (CLARITIN) 10 MG tablet take 1 tablet by mouth once daily if needed  1   • omeprazole (priLOSEC) 40 MG capsule   0   • [DISCONTINUED] oxyCODONE-acetaminophen (PERCOCET)  MG per tablet Take 1-2 tablets by mouth Every 6 (Six) Hours As Needed for pain. 40 tablet 0   • [DISCONTINUED] promethazine (PHENERGAN) 25 MG tablet Take 1 tablet by mouth Every 6 (Six) Hours As Needed for nausea. 20 tablet 0     No current facility-administered medications on file prior to visit.       Allergies   Allergen Reactions   • Nubain [Nalbuphine]      Dry heave &  passes out        The following portions of the  "patient's history were reviewed and updated as appropriate: allergies, current medications, past family history, past medical history, past social history, past surgical history and problem list.    Review of Systems   Constitutional: Negative.    HENT: Negative.    Eyes: Negative.    Respiratory: Negative.    Cardiovascular: Negative.    Gastrointestinal: Negative.    Endocrine: Negative.    Genitourinary: Negative.    Musculoskeletal: Positive for arthralgias.   Skin: Negative.    Allergic/Immunologic: Negative.    Neurological: Negative.    Hematological: Negative.    Psychiatric/Behavioral: Negative.         Objective      Physical Exam  /92   Pulse 77   Ht 174 cm (68.5\")   Wt 111 kg (244 lb 11.4 oz)   BMI 36.66 kg/m²     Body mass index is 36.66 kg/m².        GENERAL APPEARANCE: awake, alert & oriented x 3, in no acute distress and well developed, well nourished  PSYCH: normal mood and affect    Ortho Exam  Musculoskeletal   Upper Extremity   Right Shoulder     Inspection and Palpation:     Tenderness - none    Crepitus - none    Sensation is normal    Examination reveals no ecchymosis.      Strength and Tone:    Supraspinatus,  Infraspinatus - 5/5    Subscapularis - 5/5    Deltoid - 5/5     Range of Motion   Left shoulder:    Internal Rotation: ROM - T7    External Rotation: AROM - 80 degrees    Elevation through flexion: AROM - 180 degrees    Right Shoulder:    Internal Rotation: ROM - T7    External Rotation: AROM - 80 degrees    Elevation through flexion: AROM - 180 degrees     Abduction -180 degrees     Instability   Right shoulder    Sulcus sign negative    Apprehension test negative    Francois relocation test negative    Jerk test negative   Impingement   Right shoulder    Dorado impingement test positive    Neer impingement test positive   Functional Testing   Right shoulder    AC crossover adduction test negative    Abdominal compression test negative    Lift-off sign negative    Speed's test " negative    Castañeda's test negative    Horriblower's sign negative       Assessment/Plan        ICD-10-CM ICD-9-CM   1. Status post right rotator cuff repair Z98.890 V45.89     He is doing well.  He will follow-up as needed.  He is at maximal medical improvement.  He has no restrictions.    Medical Decision Making  Management Options : over-the-counter medicine    Valente Conde MD  04/20/18  8:54 AM

## 2018-04-23 ENCOUNTER — TREATMENT (OUTPATIENT)
Dept: PHYSICAL THERAPY | Facility: CLINIC | Age: 63
End: 2018-04-23

## 2018-04-23 DIAGNOSIS — Z98.890 STATUS POST ARTHROSCOPY OF SHOULDER: Primary | ICD-10-CM

## 2018-04-23 PROCEDURE — 97110 THERAPEUTIC EXERCISES: CPT | Performed by: PHYSICAL THERAPIST

## 2018-04-23 PROCEDURE — 97140 MANUAL THERAPY 1/> REGIONS: CPT | Performed by: PHYSICAL THERAPIST

## 2018-04-23 NOTE — PROGRESS NOTES
Physical Therapy Daily Progress Note    TOTAL TIME: 70 MINUTES    Bolivar Goodkarencaroline reports: shoulder feeling better, getting stronger      Objective   See Exercise, Manual, and Modality Logs for complete treatment.     THERAPEUTIC EXERCISES/ACTIVITIES ADDED TODAY: see flow sheets    Assessment/Plan  PATIENT NEEDS CONTINUED PHYSICAL THERAPY IN ORDER TO ACHIEVE FULL ROM, STRENGTH AND FUNCTION WITH NO REPORTS OF PAIN IN ORDER TO RTW WITHOUT RESTRICTIONS AND WITHOUT PAIN OR DYSFUNCTION       Progress per Plan of Care           Manual Therapy:    15     mins  94536;  Therapeutic Exercise:    55     mins  17611;     Neuromuscular Noah:        mins  32626;    Therapeutic Activity:          mins  01858;     Gait Training:           mins  74580;     Ultrasound:          mins  88688;    Electrical Stimulation:         mins  11789 ( );  Dry Needling          mins self-pay    Timed Treatment:   70   mins   Total Treatment:     70   mins    Be Hodgson, PT  Physical Therapist

## 2018-04-27 ENCOUNTER — TREATMENT (OUTPATIENT)
Dept: PHYSICAL THERAPY | Facility: CLINIC | Age: 63
End: 2018-04-27

## 2018-04-27 DIAGNOSIS — Z98.890 STATUS POST ARTHROSCOPY OF SHOULDER: Primary | ICD-10-CM

## 2018-04-27 PROCEDURE — 97110 THERAPEUTIC EXERCISES: CPT | Performed by: PHYSICAL THERAPIST

## 2018-04-27 PROCEDURE — 97140 MANUAL THERAPY 1/> REGIONS: CPT | Performed by: PHYSICAL THERAPIST

## 2018-04-30 NOTE — PROGRESS NOTES
Physical Therapy Daily Progress Note    TOTAL TIME: 70 MINUTES    Bolivar Goodkarencaroline reports: SHOULDER FEELING A LOT BETTER AND GETTING STRONGER      Objective   See Exercise, Manual, and Modality Logs for complete treatment.     THERAPEUTIC EXERCISES/ACTIVITIES ADDED TODAY: SEE FLOW SHEETS    Assessment/Plan  PATIENT NEEDS CONTINUED PHYSICAL THERAPY IN ORDER TO ACHIEVE FULL ROM, STRENGTH AND FUNCTION WITH NO REPORTS OF PAIN IN ORDER TO RTW WITHOUT RESTRICTIONS AND WITHOUT PAIN OR DYSFUNCTION       Progress per Plan of Care and Progress strengthening /stabilization /functional activity           Manual Therapy:    15     mins  43231;  Therapeutic Exercise:    55     mins  08000;     Neuromuscular Noah:        mins  34349;    Therapeutic Activity:          mins  56167;     Gait Training:           mins  16188;     Ultrasound:          mins  70686;    Electrical Stimulation:         mins  86239 ( );  Dry Needling          mins self-pay    Timed Treatment:   70   mins   Total Treatment:     70   mins    Be Hodgson PT  Physical Therapist

## 2022-10-28 ENCOUNTER — OFFICE VISIT (OUTPATIENT)
Dept: NEUROSURGERY | Facility: CLINIC | Age: 67
End: 2022-10-28

## 2022-10-28 DIAGNOSIS — R20.2 NUMBNESS AND TINGLING IN BOTH HANDS: ICD-10-CM

## 2022-10-28 DIAGNOSIS — M48.062 SPINAL STENOSIS OF LUMBAR REGION WITH NEUROGENIC CLAUDICATION: Primary | ICD-10-CM

## 2022-10-28 DIAGNOSIS — R20.0 NUMBNESS AND TINGLING IN BOTH HANDS: ICD-10-CM

## 2022-10-28 DIAGNOSIS — G56.00 CARPAL TUNNEL SYNDROME, UNSPECIFIED LATERALITY: ICD-10-CM

## 2022-10-28 DIAGNOSIS — M51.36 DDD (DEGENERATIVE DISC DISEASE), LUMBAR: ICD-10-CM

## 2022-10-28 PROCEDURE — 99204 OFFICE O/P NEW MOD 45 MIN: CPT | Performed by: NEUROLOGICAL SURGERY

## 2022-10-28 RX ORDER — ESOMEPRAZOLE MAGNESIUM 40 MG/1
40 CAPSULE, DELAYED RELEASE ORAL 2 TIMES DAILY
COMMUNITY
Start: 2022-09-01

## 2022-10-28 NOTE — PROGRESS NOTES
NAME: CINDY CLOUD   DOS: 10/28/2022  : 1955  PCP: Helio Velazquez MD    Chief Complaint:    Chief Complaint   Patient presents with   • Back Pain   • Muscle Pain     BLE   • Numbness     Bilateral hands to the elbow and feel       History of Present Illness:  67 y.o. 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  He continues to work    Most recently he was playing [employer] ball when he was in [place] experienced worsening back buttock pain bilaterally he has chronic overtones of neurogenic claudication that is recently gotten worse    He has frequency of urine at night but denies any symptoms of ongoing cauda equina syndrome such as numbness    He reports some little bit of a clumsiness in his gait he has numb hands mostly at night could be from a carpal tunnel picture but does also describe arthritic neck issues that have gotten worse she is here with his wife he is here for evaluation and has been through multiple rounds of physical therapy and is quite active    PMHX  Allergies:  Allergies   Allergen Reactions   • Nubain [Nalbuphine]      Dry heave &  passes out     Medications    Current Outpatient Medications:   •  citalopram (CeleXA) 20 MG tablet, , Disp: , Rfl: 0  •  esomeprazole (nexIUM) 40 MG capsule, Take 1 capsule by mouth 2 (Two) Times a Day., Disp: , Rfl:   Past Medical History:  Past Medical History:   Diagnosis Date   • Anemia 2022   • Arthritis Years ago   • Cervical disc disorder 2022   • Claustrophobia Always   • Low back pain Years   • Lumbosacral disc disease Years ago     Past Surgical History:  Past Surgical History:   Procedure Laterality Date   • FOOT SURGERY Left    • KNEE SURGERY Bilateral    • MASTOID SURGERY Right    • SHOULDER SURGERY Right      Social Hx:  Social History     Tobacco Use   • Smoking status: Never   •  Smokeless tobacco: Never   Substance Use Topics   • Alcohol use: Yes     Comment: Occasionally a strawberry chris   • Drug use: No     Family Hx:  Family History   Problem Relation Age of Onset   • Cancer Father      Review of Systems:        Review of Systems   Constitutional: Negative for activity change, appetite change, chills, diaphoresis, fatigue, fever and unexpected weight change.   HENT: Negative for congestion, dental problem, drooling, ear discharge, ear pain, facial swelling, hearing loss, mouth sores, nosebleeds, postnasal drip, rhinorrhea, sinus pressure, sneezing, sore throat, tinnitus, trouble swallowing and voice change.    Eyes: Negative for photophobia, pain, discharge, redness, itching and visual disturbance.   Respiratory: Negative for apnea, cough, choking, chest tightness, shortness of breath, wheezing and stridor.    Cardiovascular: Negative for chest pain, palpitations and leg swelling.   Gastrointestinal: Negative for abdominal distention, abdominal pain, anal bleeding, blood in stool, constipation, diarrhea, nausea, rectal pain and vomiting.   Endocrine: Negative for cold intolerance, heat intolerance, polydipsia, polyphagia and polyuria.   Genitourinary: Negative for decreased urine volume, difficulty urinating, dysuria, enuresis, flank pain, frequency, genital sores, hematuria and urgency.   Musculoskeletal: Positive for arthralgias and back pain. Negative for gait problem, joint swelling, myalgias, neck pain and neck stiffness.   Skin: Negative for color change, pallor, rash and wound.   Allergic/Immunologic: Negative for environmental allergies, food allergies and immunocompromised state.   Neurological: Positive for numbness. Negative for dizziness, tremors, seizures, syncope, facial asymmetry, speech difficulty, weakness, light-headedness and headaches.   Hematological: Negative for adenopathy. Does not bruise/bleed easily.   Psychiatric/Behavioral: Negative for agitation,  behavioral problems, confusion, decreased concentration, dysphoric mood, hallucinations, self-injury, sleep disturbance and suicidal ideas. The patient is not nervous/anxious and is not hyperactive.    All other systems reviewed and are negative.       I have reviewed this note template and all pertinent parts of the review of systems social, family history, surgical history and medication list    Physical Examination:  There were no vitals filed for this visit.   General Appearance:   Well developed, well nourished, well groomed, alert, and cooperative.  Neurological examination:  Neurologic Exam    Vital signs were reviewed and documented in the chart  Patient appeared in good neurologic function with normal comprehension fluent speech  Mood and affect are normal  COVID mass left in place  Arthritic stigmata of the knees hands bilaterally  Muscle bulk and tone normal  5 out of 5 strength no motor drift  Gait normal intact  Negative Romberg  No clonus long tract signs or myelopathy    Reflexes symmetric with exception of the right ankle reflex she is trace reflexes throughout  No edema noted and extremities skin appears normal  Intact vibratory sensation  Straight leg raise sign absent  No signs of intrinsic hip dysfunction  Back is without any lesions or abnormality  Feet are warm and well perfused  Trace Tinel's bilaterally at the wrist    Review of Imaging/DATA:  I reviewed an MRI of his lumbar spine is quite impressive he has a dorsal posterior L3-4 synovial cyst picture with a left eccentric disc herniation    L4-5 he is got a grade 1 spondylolisthesis there is fatty replacement of the marrow and the pedicles at the 345 area indicative chronic stress response  Diagnoses/Plan:    Mr. Caldwell is a 67 y.o. male    1.  Grade 1 spondylolisthesis L4 on 5  Central canal stenosis/chronic longstanding history neurogenic claudication with right lateral recess syndrome  2.  L3-4 central canal stenosis severe with left  eccentric disc herniation likely subacute neurogenic claudication  3.  Possible carpal tunnel numb hands  4.  Cervical spondylosis-gait instability some evidence of brisk reflexes left knee possibility of cervical myelopathy    He has a complex spine  I explained the risk benefits and expected outcome of major elective surgery for their problem, complications from approach, and infection, the risk of neurologic implications after surgery as well as need for repeat surgeries and most importantly failure to achieve quality of life improvement from the surgery to the patient.  I explained the risk of surgical intervention I think he is on his way to a two-level lumbar fusion L3-4 L4-5 he may get away with a laminectomy but obviously I need to check some lumbar flexion-extension films    From a neurosurgical standpoint he requires  Cervical spine imaging to rule out cervical myelopathy giving numb hands and slight gait instability I suspect is physiologic but given the arthritic nature of his neck and lower spinal issues I would not like to miss this  Lumbar flexion-extension film  Scoliosis x-ray to check sagittal balance  EMG nerve conduction study of the hands to check for carpal tunnel    I will do a quick phone visit with him to discuss the probability I suspect that if his listhesis is present at the 4 5 area I did recommend a lumbar interbody fusion given the disparate Dace disruption at L3-4 and the posterior lateral disease and adjacent level disease at L4-5, alternatives were given include a two-level laminectomy but I worry about instability given the chronicity of his back issues    I explained the risk benefits expected outcome from surgery, the follow-up and offered him a [facility] appointment but organ to do this over the phone

## 2022-10-31 ENCOUNTER — TELEPHONE (OUTPATIENT)
Dept: NEUROSURGERY | Facility: CLINIC | Age: 67
End: 2022-10-31

## 2022-10-31 DIAGNOSIS — M48.062 SPINAL STENOSIS OF LUMBAR REGION WITH NEUROGENIC CLAUDICATION: Primary | ICD-10-CM

## 2022-10-31 NOTE — TELEPHONE ENCOUNTER
----- Message from Jerry Vieira MD sent at 10/28/2022 12:10 PM EDT -----  Regarding: needs fy  Please check these orders to make sure it is done right

## 2022-11-04 ENCOUNTER — TRANSCRIBE ORDERS (OUTPATIENT)
Dept: CT IMAGING | Facility: CLINIC | Age: 67
End: 2022-11-04

## 2022-11-04 DIAGNOSIS — R07.81 RIB PAIN: Primary | ICD-10-CM

## 2022-11-08 ENCOUNTER — TELEPHONE (OUTPATIENT)
Dept: NEUROSURGERY | Facility: CLINIC | Age: 67
End: 2022-11-08

## 2022-11-08 DIAGNOSIS — M16.9 ARTHROSIS OF HIP: Primary | ICD-10-CM

## 2022-11-08 DIAGNOSIS — M54.9 MECHANICAL BACK PAIN: ICD-10-CM

## 2022-11-08 NOTE — TELEPHONE ENCOUNTER
Per Dr. Vieira- Scoliosis XR was not performed correctly. I have pended a new order. Please have the pt get this. -I have made a note to Radiology as well.    I have also placed a referral to Ortho for his hips. Pt's appt with Dr. Vieira needs to be moved out until AFTER ortho appt.

## 2022-11-08 NOTE — TELEPHONE ENCOUNTER
----- Message from Jerry Vieira MD sent at 11/8/2022  4:31 PM EST -----  They did not do a full standing scoliosis x-ray on this reg  Also needs a orthopedics referral for bad hips

## 2022-11-11 NOTE — TELEPHONE ENCOUNTER
Talked with the patient .  He will have MRI on Monday as planned and will complete the new order for x-rays.  Once he knows when ortho appt will be he will call us for follow up.

## 2022-11-14 ENCOUNTER — HOSPITAL ENCOUNTER (OUTPATIENT)
Dept: MRI IMAGING | Facility: HOSPITAL | Age: 67
Discharge: HOME | End: 2022-11-14

## 2022-11-14 ENCOUNTER — HOSPITAL ENCOUNTER (OUTPATIENT)
Dept: GENERAL RADIOLOGY | Facility: HOSPITAL | Age: 67
Discharge: HOME | End: 2022-11-14

## 2022-11-14 DIAGNOSIS — G56.00 CARPAL TUNNEL SYNDROME, UNSPECIFIED LATERALITY: ICD-10-CM

## 2022-11-14 DIAGNOSIS — M54.9 MECHANICAL BACK PAIN: ICD-10-CM

## 2022-11-14 PROCEDURE — 72141 MRI NECK SPINE W/O DYE: CPT

## 2022-11-14 PROCEDURE — 72082 X-RAY EXAM ENTIRE SPI 2/3 VW: CPT

## 2022-11-17 ENCOUNTER — TELEPHONE (OUTPATIENT)
Dept: NEUROSURGERY | Facility: CLINIC | Age: 67
End: 2022-11-17

## 2022-11-17 ENCOUNTER — OFFICE VISIT (OUTPATIENT)
Dept: ORTHOPEDIC SURGERY | Facility: CLINIC | Age: 67
End: 2022-11-17

## 2022-11-17 DIAGNOSIS — M16.0 PRIMARY OSTEOARTHRITIS OF BOTH HIPS: Primary | ICD-10-CM

## 2022-11-17 DIAGNOSIS — G56.03 CARPAL TUNNEL SYNDROME ON BOTH SIDES: ICD-10-CM

## 2022-11-17 PROCEDURE — 99204 OFFICE O/P NEW MOD 45 MIN: CPT | Performed by: STUDENT IN AN ORGANIZED HEALTH CARE EDUCATION/TRAINING PROGRAM

## 2022-11-17 RX ORDER — MELOXICAM 15 MG/1
15 TABLET ORAL DAILY
Qty: 30 TABLET | Refills: 3 | Status: SHIPPED | OUTPATIENT
Start: 2022-11-17

## 2022-11-17 NOTE — PROGRESS NOTES
[facility] Office Visit     Office Visit       Date: 11/17/2022   Patient Name: Bolivar Caldwell  MRN: [id number]  YOB: 1955    Referring Physician: Jerry Vieira MD     Chief Complaint:   Chief Complaint   Patient presents with   • Left Hip - Pain   • Right Hip - Pain       History of Present Illness: Bolivar Caldwell is a 67 y.o. male who is here today for evaluation of bilateral hip pain.  He reports onset of symptoms 6 months to a year ago.  He localizes pain to the posterior aspect of the hip.  He describes the pain as aching, stabbing, and shooting.  In addition to pain, he has stiffness and buckling.  Symptoms are made worse by walking, standing, sitting, sleeping, working, leisure, lying on affected side, and any movement of the hip joints.  Resting makes the pain better.  He also has extensive back injuries and has been evaluated by Dr. Vieira from neurosurgery.  He denies any falls trauma or mechanism of injury.  Relates his pain to age-related wear and tear.  Denies any fevers chills night sweats or history of blood clots.    He also reports numbness in all fingers of both hands.  He often notices this with exercise and with anytime that his hands are at shoulder level or above.  Is beginning to affect himmentally and with using/controlling his weapon at work.    Subjective   Review of Systems: Review of Systems   Constitutional: Negative for chills, fever, unexpected weight gain and unexpected weight loss.   HENT: Negative for congestion, postnasal drip and rhinorrhea.    Eyes: Negative for blurred vision.   Respiratory: Negative for shortness of breath.    Cardiovascular: Negative for leg swelling.   Gastrointestinal: Negative for abdominal pain, nausea and vomiting.   Genitourinary: Negative for difficulty urinating.   Musculoskeletal: Positive for arthralgias. Negative for gait problem, joint swelling and myalgias.   Skin:  Negative for skin lesions and wound.   Neurological: Negative for dizziness, weakness, light-headedness and numbness.   Hematological: Does not bruise/bleed easily.   Psychiatric/Behavioral: Negative for depressed mood.        Past Medical History:   Past Medical History:   Diagnosis Date   • Anemia October 2022   • Apnea     Uses CPAP   • Arthritis Years ago   • Cervical disc disorder October2022   • Claustrophobia Always   • Low back pain Years   • Lumbosacral disc disease Years ago       Past Surgical History:   Past Surgical History:   Procedure Laterality Date   • FOOT SURGERY Left    • KNEE SURGERY Bilateral    • MASTOID SURGERY Right    • SHOULDER SURGERY Right        Family History:   Family History   Problem Relation Age of Onset   • Cancer Father        Social History:   Social History     Socioeconomic History   • Marital status:    Tobacco Use   • Smoking status: Never   • Smokeless tobacco: Never   Substance and Sexual Activity   • Alcohol use: Yes     Comment: Occasionally a strawberry chris   • Drug use: No   • Sexual activity: Yes     Partners: Female       Medications:   Current Outpatient Medications:   •  citalopram (CeleXA) 20 MG tablet, , Disp: , Rfl: 0  •  esomeprazole (nexIUM) 40 MG capsule, Take 1 capsule by mouth 2 (Two) Times a Day., Disp: , Rfl:   •  meloxicam (MOBIC) 15 MG tablet, Take 1 tablet by mouth Daily., Disp: 30 tablet, Rfl: 3    Allergies:   Allergies   Allergen Reactions   • Nubain [Nalbuphine]      Dry heave &  passes out       I reviewed the patient's chief complaint, history of present illness, review of systems, past medical history, surgical history, family history, social history, medications and allergy list.     Objective    Vital Signs: There were no vitals filed for this visit.  There is no height or weight on file to calculate BMI.   BMI cannot be calculated due to outdated height or weight values.  Please input a current height/weight in Vitals and  re-renter BMIFOLLOWUP in Note to pull in correct documentation based on BMI range.     Patient reports that she is a non-smoker and has not ever been a smoker.  This behavior was applauded and she was encouraged to continue in smoking cessation.  We will continue to monitor at subsequent visits.    Ortho Exam:  Constitutional: Well developed. Well nourished.  Psychologic: Mood is appropriate. Appropriate affect.  Head and Face: Normocephalic. No obvious deformities. External Ears Normal, no lesions or masses, grossly normal hearing.  Eyes: Extraocular movements intact  Pulmonary: Unlabored, normal effort.  Cardiac: well perfused, extremities pink.  Abdomen: non-distended  Peripheral vascular: normal, pulses intact, sensation intact  Skin: No rashes on exposed skin surface.  Neuro: AOx3, No short or long term memory impairment.  Bilateral hips: No erythema ecchymosis or swelling.  Tender to palpation over the posterior aspect of the hip.  No tenderness to the lateral hip or groin.  Full range of motion in flexion extension of the hips.  Normal range of motion in the knees.  Straight leg raise, axial load, FADIR, Ekta do not elicit pain in the groin or exacerbate pain in the posterior hip.  Bilateral hands: Normal motor exam - no wasting of thenar eminence, normal pincer grasp.  Normal  strength.  Normal sensation.   + tinel sign. + compression test.       Results Review:   Imaging Results (Last 24 Hours)     ** No results found for the last 24 hours. **      XR hips bilateral w or wo pelvis 3-4 view (11/04/2022 09:18)  Indication: Bilateral hip pain    Views: AP pelvis and lateral view of the hip are submitted.    Impression:  There are no acute findings. There is no fracture subluxation or dislocation.  There are moderate to severe degenerative changes with narrowing of the femoral acetabular joint.    Comparison: No additional images were available for comparison review.    Procedures    Assessment / Plan     Assessment/Plan:   Diagnoses and all orders for this visit:    1. Primary osteoarthritis of both hips (Primary)  -     meloxicam (MOBIC) 15 MG tablet; Take 1 tablet by mouth Daily.  Dispense: 30 tablet; Refill: 3    2. Carpal tunnel syndrome on both sides      Very nice 67-year-old male with multiple joint issues.  Most prominently, he has been followed by Dr. Vieira for chronic low back and hip issues.  I reviewed Dr. Vieira's documentation as well as the images of the hips, lumbar, and cervical spine.  Patient has extensive degenerative disc disease.  While in the office today, we were able to obtain the results of his recent EMG that show bilateral moderate median sensory nerve demyelination.  He does have reproducible carpal tunnel symptoms on exam.  However, he endorses that his entire hand goes numb and that he has daily consistent chronic neck pain.  This would lead me to believe that his symptoms are higher up the chain at the cervical spine.  I would defer to Dr. Vieira for this but I am happy to treat the patient's carpal tunnel symptoms in the future.  We discussed that this would include anti-inflammatory medications, bracing, therapy, and ultrasound-guided injections around the median nerve.  If those fail, he would be referred for surgical release.  As far as his hip, he does have moderate hip osteoarthritis in both joints with space narrowing on his radiographs.  However, most of his symptoms are more over the back.  I could not reproduce or exacerbate pain with examination of his hip today in the office.  We discussed hip osteoarthritis from both a nonoperative and operative standpoint.  Operative treatment would include total hip arthroplasty.  Nonoperative treatment would include therapy, medications to control pain, and ultrasound-guided injections.  Given that his symptoms do not appear to be coming from the hip today, I would only start anti-inflammatory medication to combat his pain.  I  advised him on the signs of worsening hip pain and arthritis.  We will defer to Dr. Vieira for ongoing treatment of his back as I feel that this is her most of his symptoms are arising.  I will see him back in 3 months to monitor his symptoms and decide on further treatment at that time.    Follow Up:   Return in about 3 months (around 2/17/2023) for Recheck.      Manfred Velásquez MD  [facility]

## 2022-11-17 NOTE — TELEPHONE ENCOUNTER
Caller: Bolivar Caldwell    Relationship to patient: Self    Best call back number: [phone]        Type of visit: F/U    Requested date: ASAP      Additional notes:LAST OFFICE NOTE STATES ARASH WILL DO A TELEPHONE VISIT WITH PT AFTER TESTING IS COMPLETE. PER PT HE COMPLETED THE TESTING TODAY AND WANTS TO SCHEDULE THE CALL.     PLEASE CALL PT TO SCHEDULE.     THANK YOU,

## 2022-12-08 ENCOUNTER — TELEPHONE (OUTPATIENT)
Dept: NEUROSURGERY | Facility: CLINIC | Age: 67
End: 2022-12-08

## 2022-12-08 ENCOUNTER — OFFICE VISIT (OUTPATIENT)
Dept: NEUROSURGERY | Facility: CLINIC | Age: 67
End: 2022-12-08

## 2022-12-08 DIAGNOSIS — M53.2X6 LUMBAR SPINE INSTABILITY: Primary | ICD-10-CM

## 2022-12-08 DIAGNOSIS — M53.86 SCIATICA ASSOCIATED WITH DISORDER OF LUMBAR SPINE: Primary | ICD-10-CM

## 2022-12-08 PROCEDURE — 99212 OFFICE O/P EST SF 10 MIN: CPT | Performed by: NEUROLOGICAL SURGERY

## 2022-12-08 NOTE — PROGRESS NOTES
You have chosen to receive care through a telephone visit. Do you consent to use a telephone visit for your medical care today? Yes    This is a phone visit for follow-up I reviewed multiple studies and the findings are as follows and I explained this to him  1.  He has a cervical spine MRI multilevel degenerative disc there is no evidence of high-grade cord compression multiple levels of moderate foraminal stenosis noted I would consider this nonsurgical at present  2.  He has carpal tunnel bilaterally it is moderate that likely explains his hand numbness EMGs are unremarkable for radiculopathy  3.  Hip x-rays show moderate arthrosis could consider orthopedic referral  4.  He definitely has a lumbar spine L3-4 high-grade central stenosis with adjacent level L4-5 spondylolisthesis its mild lumbar flexion-extension films did show some mobility the films are poor quality    I talked him about surgical options that would include L4 laminectomy with cyst decompression versus L3-4-5 lumbar interbody fusion versus an L3-4 minimally invasive laminectomy I think all options are on the table right now he is having more good days than bad    He is clearly an operative candidate should his symptoms persist he is having difficulty ambulating for protracted periods of time consistent with neurogenic claudication but is currently still working as a [employer]    From a neurosurgical standpoint I think it is reasonable to keep him under waiting I will set him up a follow-up December with lumbar flexion-extension films to monitor his progress he can move that up if he needs to be seen sooner    Its been a pleasure to provide neurosurgical care I spent 15 minutes in the care of the patient

## 2023-02-01 ENCOUNTER — TELEPHONE (OUTPATIENT)
Dept: NEUROSURGERY | Facility: CLINIC | Age: 68
End: 2023-02-01
Payer: MEDICARE

## 2023-02-01 NOTE — TELEPHONE ENCOUNTER
PATIENT STATES HE ORIGINALLY WISHED TO REEVALUATE IN 90 DAYS, AND THAT DR ANTOINE INSTRUCTED HIM TO CALL IF AT ANY POINT HE FEELS HE IS READY TO PROCEED WITH SURGERY. PLEASE CALL PATIENT TO ADVISE OF NEXT STEPS IN HIS CARE ([phone]).

## 2023-02-02 NOTE — TELEPHONE ENCOUNTER
I called and spoke with the patient and told him that Jennifer would speak to Dr. Vieira tomorrow and get back with him after.  He was thankful for the call back and will be waiting on the call tomorrow.

## 2023-02-03 NOTE — TELEPHONE ENCOUNTER
Please set him up for a follow up with Dr. Vieira. He needs to get the flexion/extension xrays that Dr. Vieira ordered at his last phone visit before he comes in to the office to discuss surgical options.

## 2023-02-06 ENCOUNTER — TELEPHONE (OUTPATIENT)
Dept: NEUROSURGERY | Facility: CLINIC | Age: 68
End: 2023-02-06

## 2023-02-06 NOTE — TELEPHONE ENCOUNTER
"Telephone with Jerry Vieira MD (02/01/2023)      Provider:  Dariel   Surgery/Procedure: NY     Surgery/Procedure Date:     Last visit:   12/8/22   Next visit: 3/17/23       Reason for call:    Patient is wanting to know if their is any medication or suggestions we can give him until surgery.     Per last telephone note:    \"Please set him up for a follow up with Dr. Vieira. He needs to get the flexion/extension xrays that Dr. Vieira ordered at his last phone visit before he comes in to the office to discuss surgical options. \"    Patient has an appointment scheduled for 03/17/2023 with Dr. Vieira. Appointment was scheduled in December. Okay to see if a sooner appointment is available?           "

## 2023-02-06 NOTE — TELEPHONE ENCOUNTER
Caller: Bolivar Caldwell    Relationship to patient: Self    Best call back number: [phone]    Patient is needing: PATIENT CALLED, PATIENT WOULD LIKE TO KNOW IF THERE IS ANYTHING HE CAN DO OR ANY MEDICINE THAT CAN HELP HIM UNTIL HIS SX. PATIENT STATES HE IS IN A LOT OF PAIN AND HIS LEGS ARE GOING NUMB. PLEASE CALL PATIENT TO ADVISE.    THANK YOU.

## 2023-02-07 ENCOUNTER — TELEPHONE (OUTPATIENT)
Dept: NEUROSURGERY | Facility: CLINIC | Age: 68
End: 2023-02-07
Payer: MEDICARE

## 2023-02-07 DIAGNOSIS — M48.062 SPINAL STENOSIS OF LUMBAR REGION WITH NEUROGENIC CLAUDICATION: Primary | ICD-10-CM

## 2023-02-07 RX ORDER — TIZANIDINE 4 MG/1
4 TABLET ORAL EVERY 8 HOURS PRN
Qty: 30 TABLET | Refills: 1 | Status: SHIPPED | OUTPATIENT
Start: 2023-02-07

## 2023-02-07 RX ORDER — TRAMADOL HYDROCHLORIDE 50 MG/1
50 TABLET ORAL EVERY 6 HOURS PRN
Qty: 40 TABLET | Refills: 0 | Status: SHIPPED | OUTPATIENT
Start: 2023-02-07

## 2023-02-07 NOTE — TELEPHONE ENCOUNTER
Provider:  Dariel  Surgery/Procedure:  NY  Surgery/Procedure Date:    Last visit:   12/8/23  Next visit: 3/17/23     Reason for call: Patient would like to reschedule appointment to be seen sooner if possible, to discuss surgery options. Is having increased muscle spasms and numbness in his feet.

## 2023-02-09 NOTE — TELEPHONE ENCOUNTER
Spoke to patient. Appointment moved to earlier date, 2/23/23. Reminded patient to get x-rays prior to appointment.

## 2023-02-21 ENCOUNTER — OFFICE VISIT (OUTPATIENT)
Dept: ORTHOPEDIC SURGERY | Facility: CLINIC | Age: 68
End: 2023-02-21
Payer: MEDICARE

## 2023-02-21 VITALS
WEIGHT: 233.6 LBS | SYSTOLIC BLOOD PRESSURE: 140 MMHG | HEIGHT: 68 IN | DIASTOLIC BLOOD PRESSURE: 80 MMHG | BODY MASS INDEX: 35.4 KG/M2

## 2023-02-21 DIAGNOSIS — M53.86 SCIATICA ASSOCIATED WITH DISORDER OF LUMBAR SPINE: Primary | ICD-10-CM

## 2023-02-21 DIAGNOSIS — G56.03 CARPAL TUNNEL SYNDROME, BILATERAL: ICD-10-CM

## 2023-02-21 PROCEDURE — 99213 OFFICE O/P EST LOW 20 MIN: CPT | Performed by: STUDENT IN AN ORGANIZED HEALTH CARE EDUCATION/TRAINING PROGRAM

## 2023-02-21 PROCEDURE — 20526 THER INJECTION CARP TUNNEL: CPT | Performed by: STUDENT IN AN ORGANIZED HEALTH CARE EDUCATION/TRAINING PROGRAM

## 2023-02-21 PROCEDURE — 76942 ECHO GUIDE FOR BIOPSY: CPT | Performed by: STUDENT IN AN ORGANIZED HEALTH CARE EDUCATION/TRAINING PROGRAM

## 2023-02-21 RX ORDER — TRIAMCINOLONE ACETONIDE 40 MG/ML
20 INJECTION, SUSPENSION INTRA-ARTICULAR; INTRAMUSCULAR
Status: COMPLETED | OUTPATIENT
Start: 2023-02-21 | End: 2023-02-21

## 2023-02-21 RX ORDER — LIDOCAINE HYDROCHLORIDE 10 MG/ML
0.5 INJECTION, SOLUTION INFILTRATION; PERINEURAL
Status: COMPLETED | OUTPATIENT
Start: 2023-02-21 | End: 2023-02-21

## 2023-02-21 RX ADMIN — LIDOCAINE HYDROCHLORIDE 0.5 ML: 10 INJECTION, SOLUTION INFILTRATION; PERINEURAL at 13:59

## 2023-02-21 RX ADMIN — TRIAMCINOLONE ACETONIDE 20 MG: 40 INJECTION, SUSPENSION INTRA-ARTICULAR; INTRAMUSCULAR at 13:59

## 2023-02-21 NOTE — PROGRESS NOTES
Procedure   - Medium Joint Arthrocentesis (Bilateral Carpal Tunnel) on 2/21/2023 1:59 PM  Indications: pain (Bilateral Carpal tunnel Injections)  Details: 25 G (25G) needle, ultrasound-guided volar approach  Medications: 0.5 mL lidocaine 1 %; 20 mg triamcinolone acetonide 40 MG/ML  Outcome: tolerated well, no immediate complications  Procedure, treatment alternatives, risks and benefits explained, specific risks discussed. Consent was given by the patient. Immediately prior to procedure a time out was called to verify the correct patient, procedure, equipment, support staff and site/side marked as required. Patient was prepped and draped in the usual sterile fashion.

## 2023-02-21 NOTE — PROGRESS NOTES
[facility] Follow Up Visit     Office Follow Up      Date: 02/21/2023   Patient Name: Bolivar Caldwell  MRN: [id number]  YOB: 1955    Referring Physician: No ref. provider found     Chief Complaint:   Chief Complaint   Patient presents with   • Follow-up     3 month f/u Primary osteoarthritis of both hips      History of Present Illness: Bolivar Caldwell is a 67 y.o. male who is here today for follow up on hip and wrist complaints.  At last visit, we discussed his hips.  Since that time, he has seen Dr. Vieira again.  Scheduled to meet with him again later this week and states he plans to pursue surgical intervention.  Pain is severe and greatly limiting.  Still having numbness tingling in both hands.  EMG results are available for review.    Subjective   Review of Systems: Review of Systems   Constitutional: Positive for activity change.   HENT: Positive for nosebleeds.    Respiratory: Positive for apnea.    Cardiovascular: Positive for leg swelling.   Gastrointestinal: Positive for nausea.   Genitourinary:        Pelvic pain   Musculoskeletal: Positive for back pain, neck pain and neck stiffness.   Allergic/Immunologic: Positive for environmental allergies.   Neurological: Positive for facial asymmetry, weakness and numbness.   Psychiatric/Behavioral: Positive for sleep disturbance.        Medications:   Current Outpatient Medications:   •  citalopram (CeleXA) 20 MG tablet, , Disp: , Rfl: 0  •  esomeprazole (nexIUM) 40 MG capsule, Take 1 capsule by mouth 2 (Two) Times a Day., Disp: , Rfl:   •  meloxicam (MOBIC) 15 MG tablet, Take 1 tablet by mouth Daily., Disp: 30 tablet, Rfl: 3  •  tiZANidine (Zanaflex) 4 MG tablet, Take 1 tablet by mouth Every 8 (Eight) Hours As Needed for Muscle Spasms., Disp: 30 tablet, Rfl: 1  •  traMADol (ULTRAM) 50 MG tablet, Take 1 tablet by mouth Every 6 (Six) Hours As Needed for Moderate Pain or Severe Pain.,  "Disp: 40 tablet, Rfl: 0    Allergies:   Allergies   Allergen Reactions   • Nubain [Nalbuphine]      Dry heave &  passes out       I have reviewed and updated the patient's chief complaint, history of present illness, review of systems, past medical history, surgical history, family history, social history, medications and allergy list as appropriate.     Objective    Vital Signs:   Vitals:    02/21/23 1301   BP: 140/80   Weight: 106 kg (233 lb 9.6 oz)   Height: 172.7 cm (68\")     Body mass index is 35.52 kg/m².  Class 2 Severe Obesity (BMI >=35 and <=39.9). Obesity-related health conditions include the following: hypertension, coronary heart disease, diabetes mellitus and dyslipidemias. Obesity is unchanged. BMI is is above average; BMI management plan is completed. We discussed portion control and increasing exercise.    Patient reports that he is a non-smoker and has not ever been a smoker.  This behavior was applauded and he was encouraged to continue in smoking cessation.  We will continue to monitor at subsequent visits.    Ortho Exam:  Constitutional: General Appearance: healthy-appearing, NAD, and normal body habitus.   Psychiatric: Orientation: oriented to person, place, and time. Mood and Affect: normal mood and affect and active and alert.   Cardiovascular System: Arterial Pulses Right: radial pulse normal. Arterial Pulses Left: radial pulse normal. Edema Right: none. Edema Left: none. Varicosities Right: capillary refill test normal. Varicosities Left: capillary refill test normal.   Skin: Right Upper Extremity: normal. Left Upper Extremity: normal.   Neurological System: Sensation on the Right: normal ulnar nerve distribution and radial nerve distribution and decreased median nerve distribution. Sensation on the Left: normal ulnar nerve distribution and radial nerve distribution and decreased median nerve distribution. Special Tests on the Right: Tinel's sign at the median nerve positive and carpal " compression test positive. Special Tests on the Left: Tinel's sign at the median nerve positive and carpal compression test positive.    Results Review:   Imaging Results (Last 24 Hours)     ** No results found for the last 24 hours. **        Procedures    Assessment / Plan    Assessment/Plan:   Diagnoses and all orders for this visit:    1. Sciatica associated with disorder of lumbar spine (Primary)    2. Carpal tunnel syndrome, bilateral    Other orders  -     - Medium Joint Arthrocentesis      Follow-up low back hip and bilateral wrist complaints.  His lower extremity pain is coming from his low back and he points around the lumbosacral area.  Scheduled see Dr. Vieira later this week.  Currently on tizanidine meloxicam and tramadol for pain control.  He desires to pursue surgical intervention.  Therefore, I will defer to Dr. Vieira.  I am happy to see him back after his surgery should he continue to have hip pain though I believe most if not all will be corrected by the surgery.    Continue to have bilateral wrist pain.  His EMG shows moderate severity median sensory nerve demyelination across the wrist.  We discussed the risks and benefits of ultrasound-guided corticosteroid injection around the median nerve.  After discussion, patient elects to proceed.  Tolerated procedure well.  See procedure note.  I will see him back in 3 months to review his progress and follow from his follow-up with Dr. Vieira.    Previous documentation reviewed: 12/8/2022-office visit-Jerry Vieira MD.    Previous studies reviewed: 11/8/2022-EMG bilateral upper extremities.  11/14/2022-MRI cervical spine.    Follow Up:   Return in about 3 months (around 5/21/2023) for Recheck.      Manfred Velásquez MD  [facility]

## 2023-02-23 ENCOUNTER — HOSPITAL ENCOUNTER (OUTPATIENT)
Dept: GENERAL RADIOLOGY | Facility: HOSPITAL | Age: 68
Discharge: HOME | End: 2023-02-23
Admitting: NEUROLOGICAL SURGERY
Payer: MEDICARE

## 2023-02-23 ENCOUNTER — OFFICE VISIT (OUTPATIENT)
Dept: NEUROSURGERY | Facility: CLINIC | Age: 68
End: 2023-02-23
Payer: MEDICARE

## 2023-02-23 ENCOUNTER — PREP FOR SURGERY (OUTPATIENT)
Dept: OTHER | Facility: HOSPITAL | Age: 68
End: 2023-02-23
Payer: MEDICARE

## 2023-02-23 VITALS — BODY MASS INDEX: 35.46 KG/M2 | WEIGHT: 234 LBS | HEIGHT: 68 IN | TEMPERATURE: 97.5 F

## 2023-02-23 DIAGNOSIS — G89.29 CHRONIC BILATERAL LOW BACK PAIN WITH BILATERAL SCIATICA: Primary | ICD-10-CM

## 2023-02-23 DIAGNOSIS — M54.42 CHRONIC BILATERAL LOW BACK PAIN WITH BILATERAL SCIATICA: Primary | ICD-10-CM

## 2023-02-23 DIAGNOSIS — M53.86 SCIATICA ASSOCIATED WITH DISORDER OF LUMBAR SPINE: Primary | ICD-10-CM

## 2023-02-23 DIAGNOSIS — M54.41 CHRONIC BILATERAL LOW BACK PAIN WITH BILATERAL SCIATICA: Primary | ICD-10-CM

## 2023-02-23 DIAGNOSIS — M53.2X6 LUMBAR SPINE INSTABILITY: ICD-10-CM

## 2023-02-23 PROBLEM — R29.818 NEUROGENIC CLAUDICATION: Status: ACTIVE | Noted: 2023-02-23

## 2023-02-23 PROBLEM — G95.19 NEUROGENIC CLAUDICATION: Status: ACTIVE | Noted: 2023-02-23

## 2023-02-23 PROCEDURE — 72120 X-RAY BEND ONLY L-S SPINE: CPT

## 2023-02-23 PROCEDURE — 99214 OFFICE O/P EST MOD 30 MIN: CPT | Performed by: NEUROLOGICAL SURGERY

## 2023-02-23 RX ORDER — ACETAMINOPHEN 325 MG/1
650 TABLET ORAL ONCE
Status: CANCELLED | OUTPATIENT
Start: 2023-02-23 | End: 2023-02-23

## 2023-02-23 RX ORDER — CHLORHEXIDINE GLUCONATE 4 G/100ML
SOLUTION TOPICAL
Qty: 120 ML | Refills: 0 | Status: ON HOLD | OUTPATIENT
Start: 2023-02-23 | End: 2023-03-14

## 2023-02-23 RX ORDER — FAMOTIDINE 20 MG/1
20 TABLET, FILM COATED ORAL
Status: CANCELLED | OUTPATIENT
Start: 2023-02-23

## 2023-02-23 RX ORDER — SODIUM CHLORIDE 9 MG/ML
40 INJECTION, SOLUTION INTRAVENOUS AS NEEDED
Status: CANCELLED | OUTPATIENT
Start: 2023-02-23

## 2023-02-23 RX ORDER — HYDROCODONE BITARTRATE AND ACETAMINOPHEN 7.5; 325 MG/1; MG/1
1 TABLET ORAL ONCE
Status: CANCELLED | OUTPATIENT
Start: 2023-02-23 | End: 2023-02-23

## 2023-02-23 RX ORDER — CEFAZOLIN SODIUM 2 G/100ML
2 INJECTION, SOLUTION INTRAVENOUS ONCE
Status: CANCELLED | OUTPATIENT
Start: 2023-02-23 | End: 2023-02-23

## 2023-02-23 RX ORDER — IBUPROFEN 800 MG/1
800 TABLET ORAL ONCE
Status: CANCELLED | OUTPATIENT
Start: 2023-02-23 | End: 2023-02-23

## 2023-02-23 RX ORDER — SODIUM CHLORIDE 0.9 % (FLUSH) 0.9 %
3-10 SYRINGE (ML) INJECTION AS NEEDED
Status: CANCELLED | OUTPATIENT
Start: 2023-02-23

## 2023-02-23 RX ORDER — SODIUM CHLORIDE, SODIUM LACTATE, POTASSIUM CHLORIDE, CALCIUM CHLORIDE 600; 310; 30; 20 MG/100ML; MG/100ML; MG/100ML; MG/100ML
9 INJECTION, SOLUTION INTRAVENOUS CONTINUOUS
Status: CANCELLED | OUTPATIENT
Start: 2023-02-23

## 2023-02-23 RX ORDER — SODIUM CHLORIDE 0.9 % (FLUSH) 0.9 %
3 SYRINGE (ML) INJECTION EVERY 12 HOURS SCHEDULED
Status: CANCELLED | OUTPATIENT
Start: 2023-02-23

## 2023-02-23 NOTE — PROGRESS NOTES
NAME: CINDY CLOUD   DOS: 2023  : 1955  PCP: Helio Velazquez MD    Chief Complaint:    Chief Complaint   Patient presents with   • Low back & Bilateral Leg Pain       History of Present Illness:  67 y.o. 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  He is claudicating more than ever now he is here for evaluation with his wife he denies bowel bladder incontinence issues and is here for evaluation    PMHX  Allergies:  Allergies   Allergen Reactions   • Nubain [Nalbuphine]      Dry heave &  passes out     Medications    Current Outpatient Medications:   •  citalopram (CeleXA) 20 MG tablet, , Disp: , Rfl: 0  •  esomeprazole (nexIUM) 40 MG capsule, Take 1 capsule by mouth 2 (Two) Times a Day., Disp: , Rfl:   •  meloxicam (MOBIC) 15 MG tablet, Take 1 tablet by mouth Daily., Disp: 30 tablet, Rfl: 3  •  tiZANidine (Zanaflex) 4 MG tablet, Take 1 tablet by mouth Every 8 (Eight) Hours As Needed for Muscle Spasms., Disp: 30 tablet, Rfl: 1  •  traMADol (ULTRAM) 50 MG tablet, Take 1 tablet by mouth Every 6 (Six) Hours As Needed for Moderate Pain or Severe Pain., Disp: 40 tablet, Rfl: 0  Past Medical History:  Past Medical History:   Diagnosis Date   • Anemia 2022   • Apnea     Uses CPAP   • Arthritis Years ago   • Cervical disc disorder 2022   • Claustrophobia Always   • Low back pain Years   • Lumbosacral disc disease Years ago     Past Surgical History:  Past Surgical History:   Procedure Laterality Date   • FOOT SURGERY Left    • KNEE SURGERY Bilateral    • MASTOID SURGERY Right    • SHOULDER SURGERY Right      Social Hx:  Social History     Tobacco Use   • Smoking status: Never   • Smokeless tobacco: Never   Substance Use Topics   • Alcohol use: Yes     Comment: Occasionally a strawberry chris   • Drug use: No     Family Hx:  Family History   Problem Relation Age of Onset   • Cancer Father      Review of Systems:        Review  of Systems   Constitutional: Negative for activity change, appetite change, chills, diaphoresis, fatigue, fever and unexpected weight change.   HENT: Negative for congestion, dental problem, drooling, ear discharge, ear pain, facial swelling, hearing loss, mouth sores, nosebleeds, postnasal drip, rhinorrhea, sinus pressure, sinus pain, sneezing, sore throat, tinnitus, trouble swallowing and voice change.    Eyes: Negative for photophobia, pain, discharge, redness, itching and visual disturbance.   Respiratory: Negative for apnea, cough, choking, chest tightness, shortness of breath, wheezing and stridor.    Cardiovascular: Negative for chest pain, palpitations and leg swelling.   Gastrointestinal: Negative for abdominal distention, abdominal pain, anal bleeding, blood in stool, constipation, diarrhea, nausea, rectal pain and vomiting.   Endocrine: Negative for cold intolerance, heat intolerance, polydipsia, polyphagia and polyuria.   Genitourinary: Negative for decreased urine volume, difficulty urinating, dysuria, enuresis, flank pain, frequency, genital sores, hematuria, penile discharge, penile pain, penile swelling, scrotal swelling, testicular pain and urgency.   Musculoskeletal: Positive for back pain. Negative for arthralgias, gait problem, joint swelling, myalgias, neck pain and neck stiffness.   Skin: Negative for color change, pallor, rash and wound.   Allergic/Immunologic: Negative for environmental allergies, food allergies and immunocompromised state.   Neurological: Negative for dizziness, tremors, seizures, syncope, facial asymmetry, speech difficulty, weakness, light-headedness, numbness and headaches.   Hematological: Negative for adenopathy. Does not bruise/bleed easily.   Psychiatric/Behavioral: Negative for agitation, behavioral problems, confusion, decreased concentration, dysphoric mood, hallucinations, self-injury, sleep disturbance and suicidal ideas. The patient is not nervous/anxious and is  not hyperactive.         I have reviewed this note template and all pertinent parts of the review of systems social, family history, surgical history and medication list    Physical Examination:  Vitals:    02/23/23 1612   Temp: 97.5 °F (36.4 °C)      General Appearance:   Well developed, well nourished, well groomed, alert, and cooperative.  Neurological examination:  Neurologic Exam  He is wide awake alert and follows commands    Appears at his neurologic baseline    Gait and station are normal    Review of Imaging/DATA:  I reviewed his lumbar spine MRI as well as his cervical MRI personally.  He has advanced scoliotic curvature, but the lumbar spinal films appear with good SVA and good alignment    He has high-grade central stenosis at L3-4 and L4-5 spondylolisthesis L4-5 appears fixed there is mild intraforaminal disease  Diagnoses/Plan:    Mr. Caldwell is a 67 y.o. male   1.  Severe spinal stenosis L3-4 lesser extent L4-5 severe  2.  Thick spondylolisthesis L4-5  3.  Arthritis    I talked him about surgery.  I explained the risk benefits and expected outcome of major elective surgery for their problem, complications from approach, and infection, the risk of neurologic implications after surgery as well as need for repeat surgeries and most importantly failure to achieve quality of life improvement from the surgery to the patient.  I explained all the eventualities the complication rate etc. explained that he may require fusion down the road but given his age and his symptomatology I try to preserve his motion segment at the best as possible I try to explain the impact on his work and his recovery    We will make arrangements for 3-4 lumbar laminectomy as soon as possible

## 2023-02-24 PROBLEM — M53.86 SCIATICA ASSOCIATED WITH DISORDER OF LUMBAR SPINE: Status: ACTIVE | Noted: 2023-02-24

## 2023-03-07 ENCOUNTER — PRE-ADMISSION TESTING (OUTPATIENT)
Dept: PREADMISSION TESTING | Facility: HOSPITAL | Age: 68
End: 2023-03-07
Payer: MEDICARE

## 2023-03-07 VITALS — WEIGHT: 235.34 LBS | BODY MASS INDEX: 35.67 KG/M2 | HEIGHT: 68 IN

## 2023-03-07 DIAGNOSIS — M53.86 SCIATICA ASSOCIATED WITH DISORDER OF LUMBAR SPINE: ICD-10-CM

## 2023-03-07 LAB
ANION GAP SERPL CALCULATED.3IONS-SCNC: 10 MMOL/L (ref 5–15)
BUN SERPL-MCNC: 20 MG/DL (ref 8–23)
BUN/CREAT SERPL: 20.6 (ref 7–25)
CALCIUM SPEC-SCNC: 9 MG/DL (ref 8.6–10.5)
CHLORIDE SERPL-SCNC: 104 MMOL/L (ref 98–107)
CO2 SERPL-SCNC: 24 MMOL/L (ref 22–29)
CREAT SERPL-MCNC: 0.97 MG/DL (ref 0.76–1.27)
DEPRECATED RDW RBC AUTO: 45.1 FL (ref 37–54)
EGFRCR SERPLBLD CKD-EPI 2021: 85.6 ML/MIN/1.73
ERYTHROCYTE [DISTWIDTH] IN BLOOD BY AUTOMATED COUNT: 13.2 % (ref 12.3–15.4)
GLUCOSE SERPL-MCNC: 98 MG/DL (ref 65–99)
HCT VFR BLD AUTO: 38.7 % (ref 37.5–51)
HGB BLD-MCNC: 13 G/DL (ref 13–17.7)
MCH RBC QN AUTO: 30.9 PG (ref 26.6–33)
MCHC RBC AUTO-ENTMCNC: 33.6 G/DL (ref 31.5–35.7)
MCV RBC AUTO: 91.9 FL (ref 79–97)
PLATELET # BLD AUTO: 204 10*3/MM3 (ref 140–450)
PMV BLD AUTO: 10 FL (ref 6–12)
POTASSIUM SERPL-SCNC: 4.4 MMOL/L (ref 3.5–5.2)
QT INTERVAL: 376 MS
QTC INTERVAL: 402 MS
RBC # BLD AUTO: 4.21 10*6/MM3 (ref 4.14–5.8)
SODIUM SERPL-SCNC: 138 MMOL/L (ref 136–145)
WBC NRBC COR # BLD: 5.55 10*3/MM3 (ref 3.4–10.8)

## 2023-03-07 PROCEDURE — 93010 ELECTROCARDIOGRAM REPORT: CPT | Performed by: INTERNAL MEDICINE

## 2023-03-07 PROCEDURE — 36415 COLL VENOUS BLD VENIPUNCTURE: CPT

## 2023-03-07 PROCEDURE — 80048 BASIC METABOLIC PNL TOTAL CA: CPT

## 2023-03-07 PROCEDURE — 93005 ELECTROCARDIOGRAM TRACING: CPT

## 2023-03-07 PROCEDURE — 87081 CULTURE SCREEN ONLY: CPT

## 2023-03-07 PROCEDURE — 87181 SC STD AGAR DILUTION PER AGT: CPT

## 2023-03-07 PROCEDURE — 85027 COMPLETE CBC AUTOMATED: CPT

## 2023-03-07 RX ORDER — CETIRIZINE HYDROCHLORIDE 10 MG/1
10 TABLET ORAL DAILY
COMMUNITY

## 2023-03-07 NOTE — PAT
Patient to apply Chlorhexadine wipes  to surgical area (as instructed) the night before procedure and the AM of procedure. Wipes provided.    Patient instructed to drink 20 ounces of Gatorade and it needs to be completed 1 hour (for Main OR patients) or 2 hours (scheduled  section & [facility] patients) before given arrival time for procedure (NO RED Gatorade)    Patient verbalized understanding.    Patient viewed general PAT education video as instructed in their preoperative information received from their surgeon.  Patient stated the general PAT education video was viewed in its entirety and survey completed.  Copies of PAT general education handouts (Incentive Spirometry, Meds to Beds Program, Patient Belongings, Pre-op skin preparation instructions, Blood Glucose testing, Visitor policy, Surgery FAQ, Code H) distributed to patient if not printed. Education related to the PAT pass and skin preparation for surgery (if applicable) completed in PAT as a reinforcement to PAT education video. Patient instructed to return PAT pass provided today as well as completed skin preparation sheet (if applicable) on the day of procedure.     Additionally if patient had not viewed video yet but intended to view it at home or in our waiting area, then referred them to the handout with QR code/link provided during PAT visit.  Instructed patient to complete survey after viewing the video in its entirety.  Encouraged patient/family to read PAT general education handouts thoroughly and notify PAT staff with any questions or concerns. Patient verbalized understanding of all information and priority content.    Bactroban (if prescribed) and Chlorhexidine Prescription prescribed by physician before PAT visit.  Verified with patient that medication(s) were picked up from their pharmacy.  Written instructions given to patient during PAT visit.  Patient/family also instructed to complete skin prep checklist and return the checklist  on the day of surgery to preoperative staff.  Patient/family verbalized understanding.    Patient instructed to bring CPAP mask and tubing to the hospital for overnight stay.  Explained that it is not necessary to bring their CPAP machine to the hospital instead a CPAP machine will be provided for use by the hospital. If patient knows their CPAP settings, those settings will be implemented.  If not, the CPAP machine will be utilized on the auto setting using their mask and tubing.    Patient verbalized understanding.

## 2023-03-08 ENCOUNTER — TELEPHONE (OUTPATIENT)
Dept: NEUROSURGERY | Facility: CLINIC | Age: 68
End: 2023-03-08
Payer: MEDICARE

## 2023-03-08 LAB — MRSA SPEC QL CULT: ABNORMAL

## 2023-03-08 NOTE — TELEPHONE ENCOUNTER
Provider:  Dariel  Surgery/Procedure:  Lumbar laminectomy L3 partial, L4 complete bilateral foraminotomies L3-4 L4-5  Surgery/Procedure Date:  Upcoming on 3/14/23  Last visit:   2/23/23  Next visit: 3/17/23     Reason for call: Received a call from Annie with pre admission testing. Mr. 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 I called patient and left a voicemail of the lab results, and reinforced to him that this does not mean he has an active infection but that he has been exposed at one time and is colonized. However that we wanted to make sure that he did receive the mupiricin ointment and began to swab his nares twice per day starting 5 days prior to surgery.

## 2023-03-13 ENCOUNTER — ANESTHESIA EVENT (OUTPATIENT)
Dept: PERIOP | Facility: HOSPITAL | Age: 68
End: 2023-03-13
Payer: MEDICARE

## 2023-03-13 RX ORDER — SODIUM CHLORIDE 0.9 % (FLUSH) 0.9 %
10 SYRINGE (ML) INJECTION EVERY 12 HOURS SCHEDULED
Status: CANCELLED | OUTPATIENT
Start: 2023-03-13

## 2023-03-14 ENCOUNTER — ANESTHESIA (OUTPATIENT)
Dept: PERIOP | Facility: HOSPITAL | Age: 68
End: 2023-03-14
Payer: MEDICARE

## 2023-03-14 ENCOUNTER — APPOINTMENT (OUTPATIENT)
Dept: GENERAL RADIOLOGY | Facility: HOSPITAL | Age: 68
End: 2023-03-14
Payer: MEDICARE

## 2023-03-14 ENCOUNTER — HOSPITAL ENCOUNTER (OUTPATIENT)
Facility: HOSPITAL | Age: 68
Discharge: HOME | End: 2023-03-15
Attending: NEUROLOGICAL SURGERY | Admitting: NEUROLOGICAL SURGERY
Payer: MEDICARE

## 2023-03-14 DIAGNOSIS — M53.86 SCIATICA ASSOCIATED WITH DISORDER OF LUMBAR SPINE: ICD-10-CM

## 2023-03-14 PROCEDURE — 25010000002 FENTANYL CITRATE (PF) 100 MCG/2ML SOLUTION: Performed by: NURSE ANESTHETIST, CERTIFIED REGISTERED

## 2023-03-14 PROCEDURE — 25010000002 ONDANSETRON PER 1 MG: Performed by: NURSE ANESTHETIST, CERTIFIED REGISTERED

## 2023-03-14 PROCEDURE — 25010000002 DEXAMETHASONE PER 1 MG: Performed by: NURSE ANESTHETIST, CERTIFIED REGISTERED

## 2023-03-14 PROCEDURE — 63710000001 HYDROCODONE-ACETAMINOPHEN 7.5-325 MG TABLET: Performed by: NEUROLOGICAL SURGERY

## 2023-03-14 PROCEDURE — 25010000002 FENTANYL CITRATE (PF) 50 MCG/ML SOLUTION

## 2023-03-14 PROCEDURE — A9270 NON-COVERED ITEM OR SERVICE: HCPCS | Performed by: NEUROLOGICAL SURGERY

## 2023-03-14 PROCEDURE — 25010000002 PROPOFOL 10 MG/ML EMULSION: Performed by: NURSE ANESTHETIST, CERTIFIED REGISTERED

## 2023-03-14 PROCEDURE — 63710000001 ACETAMINOPHEN 325 MG TABLET: Performed by: NEUROLOGICAL SURGERY

## 2023-03-14 PROCEDURE — 25010000002 BUPRENORPHINE PER 0.1 MG: Performed by: NEUROLOGICAL SURGERY

## 2023-03-14 PROCEDURE — 63710000001 IBUPROFEN 800 MG TABLET: Performed by: NEUROLOGICAL SURGERY

## 2023-03-14 PROCEDURE — 25010000002 VANCOMYCIN 10 G RECONSTITUTED SOLUTION: Performed by: ANESTHESIOLOGY

## 2023-03-14 PROCEDURE — 25010000002 DEXAMETHASONE SODIUM PHOSPHATE 10 MG/ML SOLUTION 1 ML VIAL: Performed by: NEUROLOGICAL SURGERY

## 2023-03-14 PROCEDURE — 63267 EXCISE INTRSPINL LESION LMBR: CPT | Performed by: NEUROLOGICAL SURGERY

## 2023-03-14 PROCEDURE — 25010000002 FENTANYL CITRATE (PF) 100 MCG/2ML SOLUTION: Performed by: ANESTHESIOLOGY

## 2023-03-14 PROCEDURE — 63047 LAM FACETEC & FORAMOT LUMBAR: CPT | Performed by: NEUROLOGICAL SURGERY

## 2023-03-14 PROCEDURE — 63710000001 FAMOTIDINE 20 MG TABLET: Performed by: NEUROLOGICAL SURGERY

## 2023-03-14 PROCEDURE — 25010000002 CEFAZOLIN IN DEXTROSE 2-4 GM/100ML-% SOLUTION: Performed by: NEUROLOGICAL SURGERY

## 2023-03-14 PROCEDURE — 63267 EXCISE INTRSPINL LESION LMBR: CPT | Performed by: PHYSICIAN ASSISTANT

## 2023-03-14 PROCEDURE — 63047 LAM FACETEC & FORAMOT LUMBAR: CPT | Performed by: PHYSICIAN ASSISTANT

## 2023-03-14 PROCEDURE — 76000 FLUOROSCOPY <1 HR PHYS/QHP: CPT

## 2023-03-14 PROCEDURE — 63710000001 MELOXICAM 15 MG TABLET: Performed by: NEUROLOGICAL SURGERY

## 2023-03-14 DEVICE — FLOSEAL HEMOSTATIC MATRIX, 10ML
Type: IMPLANTABLE DEVICE | Site: SPINE LUMBAR | Status: FUNCTIONAL
Brand: FLOSEAL HEMOSTATIC MATRIX

## 2023-03-14 DEVICE — HEMOST ABS SURGIFOAM SZ100 8X12 10MM: Type: IMPLANTABLE DEVICE | Site: SPINE LUMBAR | Status: FUNCTIONAL

## 2023-03-14 RX ORDER — MELOXICAM 15 MG/1
15 TABLET ORAL DAILY
Status: DISCONTINUED | OUTPATIENT
Start: 2023-03-14 | End: 2023-03-15 | Stop reason: HOSPADM

## 2023-03-14 RX ORDER — LABETALOL HYDROCHLORIDE 5 MG/ML
5 INJECTION, SOLUTION INTRAVENOUS
Status: DISCONTINUED | OUTPATIENT
Start: 2023-03-14 | End: 2023-03-14 | Stop reason: HOSPADM

## 2023-03-14 RX ORDER — SODIUM CHLORIDE 0.9 % (FLUSH) 0.9 %
10 SYRINGE (ML) INJECTION EVERY 12 HOURS SCHEDULED
Status: DISCONTINUED | OUTPATIENT
Start: 2023-03-14 | End: 2023-03-15 | Stop reason: HOSPADM

## 2023-03-14 RX ORDER — PANTOPRAZOLE SODIUM 40 MG/1
40 TABLET, DELAYED RELEASE ORAL
Status: DISCONTINUED | OUTPATIENT
Start: 2023-03-15 | End: 2023-03-15 | Stop reason: HOSPADM

## 2023-03-14 RX ORDER — FENTANYL CITRATE 50 UG/ML
50 INJECTION, SOLUTION INTRAMUSCULAR; INTRAVENOUS
Status: DISCONTINUED | OUTPATIENT
Start: 2023-03-14 | End: 2023-03-14 | Stop reason: HOSPADM

## 2023-03-14 RX ORDER — DEXAMETHASONE SODIUM PHOSPHATE 4 MG/ML
INJECTION, SOLUTION INTRA-ARTICULAR; INTRALESIONAL; INTRAMUSCULAR; INTRAVENOUS; SOFT TISSUE AS NEEDED
Status: DISCONTINUED | OUTPATIENT
Start: 2023-03-14 | End: 2023-03-14 | Stop reason: SURG

## 2023-03-14 RX ORDER — HYDROMORPHONE HYDROCHLORIDE 1 MG/ML
0.5 INJECTION, SOLUTION INTRAMUSCULAR; INTRAVENOUS; SUBCUTANEOUS
Status: DISCONTINUED | OUTPATIENT
Start: 2023-03-14 | End: 2023-03-15 | Stop reason: HOSPADM

## 2023-03-14 RX ORDER — HYDROCODONE BITARTRATE AND ACETAMINOPHEN 5; 325 MG/1; MG/1
1 TABLET ORAL EVERY 4 HOURS PRN
Status: DISCONTINUED | OUTPATIENT
Start: 2023-03-14 | End: 2023-03-15 | Stop reason: HOSPADM

## 2023-03-14 RX ORDER — LIDOCAINE HYDROCHLORIDE AND EPINEPHRINE 5; 5 MG/ML; UG/ML
INJECTION, SOLUTION INFILTRATION; PERINEURAL AS NEEDED
Status: DISCONTINUED | OUTPATIENT
Start: 2023-03-14 | End: 2023-03-14 | Stop reason: HOSPADM

## 2023-03-14 RX ORDER — TRAMADOL HYDROCHLORIDE 50 MG/1
50 TABLET ORAL EVERY 6 HOURS PRN
Status: DISCONTINUED | OUTPATIENT
Start: 2023-03-14 | End: 2023-03-15 | Stop reason: HOSPADM

## 2023-03-14 RX ORDER — DROPERIDOL 2.5 MG/ML
0.62 INJECTION, SOLUTION INTRAMUSCULAR; INTRAVENOUS ONCE AS NEEDED
Status: DISCONTINUED | OUTPATIENT
Start: 2023-03-14 | End: 2023-03-14 | Stop reason: HOSPADM

## 2023-03-14 RX ORDER — ROCURONIUM BROMIDE 10 MG/ML
INJECTION, SOLUTION INTRAVENOUS AS NEEDED
Status: DISCONTINUED | OUTPATIENT
Start: 2023-03-14 | End: 2023-03-14 | Stop reason: SURG

## 2023-03-14 RX ORDER — FENTANYL CITRATE 50 UG/ML
50 INJECTION, SOLUTION INTRAMUSCULAR; INTRAVENOUS ONCE
Status: COMPLETED | OUTPATIENT
Start: 2023-03-14 | End: 2023-03-14

## 2023-03-14 RX ORDER — FAMOTIDINE 20 MG/1
20 TABLET, FILM COATED ORAL
Status: COMPLETED | OUTPATIENT
Start: 2023-03-14 | End: 2023-03-14

## 2023-03-14 RX ORDER — MAGNESIUM HYDROXIDE 1200 MG/15ML
LIQUID ORAL AS NEEDED
Status: DISCONTINUED | OUTPATIENT
Start: 2023-03-14 | End: 2023-03-14 | Stop reason: HOSPADM

## 2023-03-14 RX ORDER — ACETAMINOPHEN 325 MG/1
650 TABLET ORAL EVERY 4 HOURS PRN
Status: DISCONTINUED | OUTPATIENT
Start: 2023-03-14 | End: 2023-03-15 | Stop reason: HOSPADM

## 2023-03-14 RX ORDER — PHENYLEPHRINE HCL IN 0.9% NACL 1 MG/10 ML
SYRINGE (ML) INTRAVENOUS AS NEEDED
Status: DISCONTINUED | OUTPATIENT
Start: 2023-03-14 | End: 2023-03-14 | Stop reason: SURG

## 2023-03-14 RX ORDER — SODIUM CHLORIDE 0.9 % (FLUSH) 0.9 %
3 SYRINGE (ML) INJECTION EVERY 12 HOURS SCHEDULED
Status: DISCONTINUED | OUTPATIENT
Start: 2023-03-14 | End: 2023-03-14 | Stop reason: HOSPADM

## 2023-03-14 RX ORDER — PROPOFOL 10 MG/ML
VIAL (ML) INTRAVENOUS AS NEEDED
Status: DISCONTINUED | OUTPATIENT
Start: 2023-03-14 | End: 2023-03-14 | Stop reason: SURG

## 2023-03-14 RX ORDER — NALOXONE HCL 0.4 MG/ML
0.4 VIAL (ML) INJECTION
Status: DISCONTINUED | OUTPATIENT
Start: 2023-03-14 | End: 2023-03-15 | Stop reason: HOSPADM

## 2023-03-14 RX ORDER — SODIUM CHLORIDE 9 MG/ML
40 INJECTION, SOLUTION INTRAVENOUS AS NEEDED
Status: DISCONTINUED | OUTPATIENT
Start: 2023-03-14 | End: 2023-03-15 | Stop reason: HOSPADM

## 2023-03-14 RX ORDER — TIZANIDINE 4 MG/1
4 TABLET ORAL EVERY 8 HOURS PRN
Status: DISCONTINUED | OUTPATIENT
Start: 2023-03-14 | End: 2023-03-15 | Stop reason: HOSPADM

## 2023-03-14 RX ORDER — SODIUM CHLORIDE 9 MG/ML
40 INJECTION, SOLUTION INTRAVENOUS AS NEEDED
Status: DISCONTINUED | OUTPATIENT
Start: 2023-03-14 | End: 2023-03-14 | Stop reason: HOSPADM

## 2023-03-14 RX ORDER — ALBUTEROL SULFATE 2.5 MG/3ML
2.5 SOLUTION RESPIRATORY (INHALATION) ONCE AS NEEDED
Status: DISCONTINUED | OUTPATIENT
Start: 2023-03-14 | End: 2023-03-14 | Stop reason: HOSPADM

## 2023-03-14 RX ORDER — SODIUM CHLORIDE 0.9 % (FLUSH) 0.9 %
3-10 SYRINGE (ML) INJECTION AS NEEDED
Status: DISCONTINUED | OUTPATIENT
Start: 2023-03-14 | End: 2023-03-14 | Stop reason: HOSPADM

## 2023-03-14 RX ORDER — FENTANYL CITRATE 50 UG/ML
INJECTION, SOLUTION INTRAMUSCULAR; INTRAVENOUS
Status: COMPLETED
Start: 2023-03-14 | End: 2023-03-14

## 2023-03-14 RX ORDER — CITALOPRAM 20 MG/1
20 TABLET ORAL DAILY
Status: DISCONTINUED | OUTPATIENT
Start: 2023-03-15 | End: 2023-03-15 | Stop reason: HOSPADM

## 2023-03-14 RX ORDER — FENTANYL CITRATE 50 UG/ML
INJECTION, SOLUTION INTRAMUSCULAR; INTRAVENOUS AS NEEDED
Status: DISCONTINUED | OUTPATIENT
Start: 2023-03-14 | End: 2023-03-14 | Stop reason: SURG

## 2023-03-14 RX ORDER — SODIUM CHLORIDE 0.9 % (FLUSH) 0.9 %
10 SYRINGE (ML) INJECTION AS NEEDED
Status: DISCONTINUED | OUTPATIENT
Start: 2023-03-14 | End: 2023-03-15 | Stop reason: HOSPADM

## 2023-03-14 RX ORDER — MIDAZOLAM HYDROCHLORIDE 1 MG/ML
0.5 INJECTION INTRAMUSCULAR; INTRAVENOUS
Status: DISCONTINUED | OUTPATIENT
Start: 2023-03-14 | End: 2023-03-14 | Stop reason: HOSPADM

## 2023-03-14 RX ORDER — ONDANSETRON 2 MG/ML
INJECTION INTRAMUSCULAR; INTRAVENOUS AS NEEDED
Status: DISCONTINUED | OUTPATIENT
Start: 2023-03-14 | End: 2023-03-14 | Stop reason: SURG

## 2023-03-14 RX ORDER — SODIUM CHLORIDE, SODIUM LACTATE, POTASSIUM CHLORIDE, CALCIUM CHLORIDE 600; 310; 30; 20 MG/100ML; MG/100ML; MG/100ML; MG/100ML
9 INJECTION, SOLUTION INTRAVENOUS CONTINUOUS
Status: DISCONTINUED | OUTPATIENT
Start: 2023-03-14 | End: 2023-03-15 | Stop reason: HOSPADM

## 2023-03-14 RX ORDER — TEMAZEPAM 15 MG/1
15 CAPSULE ORAL NIGHTLY PRN
Status: DISCONTINUED | OUTPATIENT
Start: 2023-03-14 | End: 2023-03-15 | Stop reason: HOSPADM

## 2023-03-14 RX ORDER — IBUPROFEN 800 MG/1
800 TABLET ORAL ONCE
Status: COMPLETED | OUTPATIENT
Start: 2023-03-14 | End: 2023-03-14

## 2023-03-14 RX ORDER — HYDROCODONE BITARTRATE AND ACETAMINOPHEN 7.5; 325 MG/1; MG/1
1 TABLET ORAL ONCE
Status: COMPLETED | OUTPATIENT
Start: 2023-03-14 | End: 2023-03-14

## 2023-03-14 RX ORDER — LIDOCAINE HYDROCHLORIDE 10 MG/ML
0.5 INJECTION, SOLUTION EPIDURAL; INFILTRATION; INTRACAUDAL; PERINEURAL ONCE AS NEEDED
Status: COMPLETED | OUTPATIENT
Start: 2023-03-14 | End: 2023-03-14

## 2023-03-14 RX ORDER — CEFAZOLIN SODIUM 2 G/100ML
2 INJECTION, SOLUTION INTRAVENOUS ONCE
Status: DISCONTINUED | OUTPATIENT
Start: 2023-03-14 | End: 2023-03-14

## 2023-03-14 RX ORDER — ACETAMINOPHEN 325 MG/1
650 TABLET ORAL ONCE
Status: COMPLETED | OUTPATIENT
Start: 2023-03-14 | End: 2023-03-14

## 2023-03-14 RX ORDER — ONDANSETRON 2 MG/ML
4 INJECTION INTRAMUSCULAR; INTRAVENOUS ONCE AS NEEDED
Status: DISCONTINUED | OUTPATIENT
Start: 2023-03-14 | End: 2023-03-14 | Stop reason: HOSPADM

## 2023-03-14 RX ORDER — AMOXICILLIN 250 MG
1 CAPSULE ORAL NIGHTLY PRN
Status: DISCONTINUED | OUTPATIENT
Start: 2023-03-14 | End: 2023-03-15 | Stop reason: HOSPADM

## 2023-03-14 RX ORDER — LIDOCAINE HYDROCHLORIDE 10 MG/ML
INJECTION, SOLUTION EPIDURAL; INFILTRATION; INTRACAUDAL; PERINEURAL AS NEEDED
Status: DISCONTINUED | OUTPATIENT
Start: 2023-03-14 | End: 2023-03-14 | Stop reason: SURG

## 2023-03-14 RX ORDER — CEFAZOLIN SODIUM 2 G/100ML
2 INJECTION, SOLUTION INTRAVENOUS EVERY 8 HOURS
Status: COMPLETED | OUTPATIENT
Start: 2023-03-14 | End: 2023-03-15

## 2023-03-14 RX ORDER — SODIUM CHLORIDE 0.9 % (FLUSH) 0.9 %
10 SYRINGE (ML) INJECTION AS NEEDED
Status: DISCONTINUED | OUTPATIENT
Start: 2023-03-14 | End: 2023-03-14 | Stop reason: HOSPADM

## 2023-03-14 RX ADMIN — LIDOCAINE HYDROCHLORIDE 50 MG: 10 INJECTION, SOLUTION EPIDURAL; INFILTRATION; INTRACAUDAL; PERINEURAL at 16:17

## 2023-03-14 RX ADMIN — FENTANYL CITRATE 100 MCG: 50 INJECTION, SOLUTION INTRAMUSCULAR; INTRAVENOUS at 16:17

## 2023-03-14 RX ADMIN — MELOXICAM 15 MG: 15 TABLET ORAL at 20:05

## 2023-03-14 RX ADMIN — HYDROCODONE BITARTRATE AND ACETAMINOPHEN 1 TABLET: 7.5; 325 TABLET ORAL at 13:18

## 2023-03-14 RX ADMIN — CEFAZOLIN SODIUM 2 G: 2 INJECTION, SOLUTION INTRAVENOUS at 20:05

## 2023-03-14 RX ADMIN — ROCURONIUM BROMIDE 50 MG: 50 INJECTION, SOLUTION INTRAVENOUS at 16:17

## 2023-03-14 RX ADMIN — PROPOFOL 200 MG: 10 INJECTION, EMULSION INTRAVENOUS at 16:17

## 2023-03-14 RX ADMIN — ONDANSETRON 4 MG: 2 INJECTION INTRAMUSCULAR; INTRAVENOUS at 17:44

## 2023-03-14 RX ADMIN — ACETAMINOPHEN 650 MG: 325 TABLET ORAL at 13:18

## 2023-03-14 RX ADMIN — SODIUM CHLORIDE, POTASSIUM CHLORIDE, SODIUM LACTATE AND CALCIUM CHLORIDE: 600; 310; 30; 20 INJECTION, SOLUTION INTRAVENOUS at 17:02

## 2023-03-14 RX ADMIN — FENTANYL CITRATE 50 MCG: 50 INJECTION, SOLUTION INTRAMUSCULAR; INTRAVENOUS at 16:06

## 2023-03-14 RX ADMIN — SUGAMMADEX 200 MG: 100 INJECTION, SOLUTION INTRAVENOUS at 17:45

## 2023-03-14 RX ADMIN — VANCOMYCIN HYDROCHLORIDE 1500 MG: 10 INJECTION, POWDER, LYOPHILIZED, FOR SOLUTION INTRAVENOUS at 15:50

## 2023-03-14 RX ADMIN — LIDOCAINE HYDROCHLORIDE 0.5 ML: 10 INJECTION, SOLUTION EPIDURAL; INFILTRATION; INTRACAUDAL; PERINEURAL at 13:13

## 2023-03-14 RX ADMIN — SODIUM CHLORIDE, POTASSIUM CHLORIDE, SODIUM LACTATE AND CALCIUM CHLORIDE 9 ML/HR: 600; 310; 30; 20 INJECTION, SOLUTION INTRAVENOUS at 13:13

## 2023-03-14 RX ADMIN — IBUPROFEN 800 MG: 800 TABLET, FILM COATED ORAL at 13:18

## 2023-03-14 RX ADMIN — FENTANYL CITRATE 50 MCG: 50 INJECTION, SOLUTION INTRAMUSCULAR; INTRAVENOUS at 18:47

## 2023-03-14 RX ADMIN — FAMOTIDINE 20 MG: 20 TABLET ORAL at 13:13

## 2023-03-14 RX ADMIN — ROCURONIUM BROMIDE 20 MG: 50 INJECTION, SOLUTION INTRAVENOUS at 16:58

## 2023-03-14 RX ADMIN — Medication 100 MCG: at 17:00

## 2023-03-14 RX ADMIN — Medication 100 MCG: at 16:46

## 2023-03-14 RX ADMIN — DEXAMETHASONE SODIUM PHOSPHATE 8 MG: 4 INJECTION, SOLUTION INTRAMUSCULAR; INTRAVENOUS at 16:38

## 2023-03-14 RX ADMIN — Medication 100 MCG: at 16:48

## 2023-03-14 RX ADMIN — PROPOFOL 200 MCG/KG/MIN: 10 INJECTION, EMULSION INTRAVENOUS at 16:17

## 2023-03-14 NOTE — ANESTHESIA PREPROCEDURE EVALUATION
Anesthesia Evaluation     Patient summary reviewed and Nursing notes reviewed   history of anesthetic complications: PONV               Airway   Mallampati: II  TM distance: >3 FB  Neck ROM: full  No difficulty expected  Dental - normal exam     Pulmonary - normal exam   (+) sleep apnea on CPAP,   Cardiovascular - negative cardio ROS and normal exam        Neuro/Psych- negative ROS  GI/Hepatic/Renal/Endo    (+) morbid obesity, GERD,      Musculoskeletal (-) negative ROS    Abdominal  - normal exam    Bowel sounds: normal.   Substance History - negative use     OB/GYN negative ob/gyn ROS         Other                      Anesthesia Plan    ASA 3     general     intravenous induction     Anesthetic plan, risks, benefits, and alternatives have been provided, discussed and informed consent has been obtained with: patient.    Plan discussed with CRNA.        CODE STATUS:

## 2023-03-14 NOTE — INTERVAL H&P NOTE
"[facility] Pre-op    Full history and physical note from office is attached.    /88 (BP Location: Right arm, Patient Position: Lying)   Pulse 61   Temp 97.2 °F (36.2 °C) (Temporal)   Resp 18   Ht 172.7 cm (67.99\")   Wt 107 kg (235 lb 14.3 oz)   SpO2 98%   BMI 35.88 kg/m²       LAB Results:  Lab Results   Component Value Date    WBC 5.55 03/07/2023    HGB 13.0 03/07/2023    HCT 38.7 03/07/2023    MCV 91.9 03/07/2023     03/07/2023    GLUCOSE 98 03/07/2023    BUN 20 03/07/2023    CREATININE 0.97 03/07/2023     03/07/2023    K 4.4 03/07/2023     03/07/2023    CO2 24.0 03/07/2023    CALCIUM 9.0 03/07/2023       Cancer Staging (if applicable)  Cancer Patient: __ yes __no __unknown__N/A; If yes, clinical stage T:__ N:__M:__, stage group or __N/A      Impression: Sciatica associated with disorder of lumbar spine       Plan: Lumbar laminectomy L3 partial, L4 complete bilateral foraminotomies L3-4 L4-5      Renee Talley, ANA   3/14/2023   13:26 EDT   "

## 2023-03-14 NOTE — ANESTHESIA POSTPROCEDURE EVALUATION
Patient: Bolivar Caldwell    Procedure Summary     Date: 03/14/23 Room / Location:  [facility] [address] [place] [address] [facility]    Anesthesia Start: 1613 Anesthesia Stop: 1819    Procedure: Lumbar laminectomy L3 partial, L4 complete bilateral foraminotomies L3-4 L4-5 (Spine Lumbar) Diagnosis:       Sciatica associated with disorder of lumbar spine      (Sciatica associated with disorder of lumbar spine [M53.86])    Surgeons: Jerry Vieira MD Provider: Zander Fried MD    Anesthesia Type: general ASA Status: 3          Anesthesia Type: general    Vitals  Vitals Value Taken Time   /61 03/14/23 1817   Temp 97.8 °F (36.6 °C) 03/14/23 1816   Pulse 56 03/14/23 1819   Resp 16 03/14/23 1816   SpO2 100 % 03/14/23 1819   Vitals shown include unvalidated device data.        Post Anesthesia Care and Evaluation    Patient location during evaluation: PACU  Patient participation: complete - patient participated  Level of consciousness: awake and alert  Pain management: adequate    Airway patency: patent  Anesthetic complications: No anesthetic complications  PONV Status: none  Cardiovascular status: hemodynamically stable and acceptable  Respiratory status: nonlabored ventilation, acceptable and nasal cannula  Hydration status: acceptable

## 2023-03-14 NOTE — ANESTHESIA PROCEDURE NOTES
Airway  Urgency: elective    Date/Time: 3/14/2023 4:19 PM  Airway not difficult    General Information and Staff    Patient location during procedure: OR  CRNA/CAA: Orestes Mejia CRNA    Indications and Patient Condition  Indications for airway management: airway protection    Preoxygenated: yes  MILS not maintained throughout  Mask difficulty assessment: 2 - vent by mask + OA or adjuvant +/- NMBA    Final Airway Details  Final airway type: endotracheal airway      Successful airway: ETT  Cuffed: yes   Successful intubation technique: video laryngoscopy  Facilitating devices/methods: intubating stylet  Endotracheal tube insertion site: oral  Blade: Johnson  Blade size: 3  ETT size (mm): 7.5  Cormack-Lehane Classification: grade I - full view of glottis  Placement verified by: chest auscultation and capnometry   Cuff volume (mL): 10  Measured from: lips  ETT/EBT  to lips (cm): 23  Number of attempts at approach: 1  Assessment: lips, teeth, and gum same as pre-op and atraumatic intubation    Additional Comments  Negative epigastric sounds, Breath sound equal bilaterally with symmetric chest rise and fall

## 2023-03-15 VITALS
HEART RATE: 72 BPM | RESPIRATION RATE: 20 BRPM | OXYGEN SATURATION: 97 % | DIASTOLIC BLOOD PRESSURE: 72 MMHG | TEMPERATURE: 98.7 F | HEIGHT: 68 IN | SYSTOLIC BLOOD PRESSURE: 146 MMHG | BODY MASS INDEX: 35.75 KG/M2 | WEIGHT: 235.89 LBS

## 2023-03-15 PROCEDURE — 94799 UNLISTED PULMONARY SVC/PX: CPT

## 2023-03-15 PROCEDURE — 63710000001 MELOXICAM 15 MG TABLET: Performed by: NEUROLOGICAL SURGERY

## 2023-03-15 PROCEDURE — A9270 NON-COVERED ITEM OR SERVICE: HCPCS | Performed by: NEUROLOGICAL SURGERY

## 2023-03-15 PROCEDURE — 94660 CPAP INITIATION&MGMT: CPT

## 2023-03-15 PROCEDURE — 25010000002 CEFAZOLIN IN DEXTROSE 2-4 GM/100ML-% SOLUTION: Performed by: NEUROLOGICAL SURGERY

## 2023-03-15 PROCEDURE — 97116 GAIT TRAINING THERAPY: CPT

## 2023-03-15 PROCEDURE — 63710000001 PANTOPRAZOLE 40 MG TABLET DELAYED-RELEASE: Performed by: NEUROLOGICAL SURGERY

## 2023-03-15 PROCEDURE — 97165 OT EVAL LOW COMPLEX 30 MIN: CPT

## 2023-03-15 PROCEDURE — 63710000001 TRAMADOL 50 MG TABLET: Performed by: NEUROLOGICAL SURGERY

## 2023-03-15 PROCEDURE — 63710000001 CITALOPRAM 20 MG TABLET: Performed by: NEUROLOGICAL SURGERY

## 2023-03-15 PROCEDURE — 97161 PT EVAL LOW COMPLEX 20 MIN: CPT

## 2023-03-15 PROCEDURE — 63710000001 TIZANIDINE 4 MG TABLET: Performed by: NEUROLOGICAL SURGERY

## 2023-03-15 PROCEDURE — 63710000001 HYDROCODONE-ACETAMINOPHEN 5-325 MG TABLET: Performed by: NEUROLOGICAL SURGERY

## 2023-03-15 RX ORDER — HYDROCODONE BITARTRATE AND ACETAMINOPHEN 5; 325 MG/1; MG/1
1 TABLET ORAL EVERY 8 HOURS PRN
Qty: 20 TABLET | Refills: 0 | Status: SHIPPED | OUTPATIENT
Start: 2023-03-15 | End: 2023-03-22

## 2023-03-15 RX ADMIN — TRAMADOL HYDROCHLORIDE 50 MG: 50 TABLET ORAL at 04:49

## 2023-03-15 RX ADMIN — MELOXICAM 15 MG: 15 TABLET ORAL at 09:05

## 2023-03-15 RX ADMIN — PANTOPRAZOLE SODIUM 40 MG: 40 TABLET, DELAYED RELEASE ORAL at 04:49

## 2023-03-15 RX ADMIN — Medication 10 ML: at 09:08

## 2023-03-15 RX ADMIN — TIZANIDINE 4 MG: 4 TABLET ORAL at 01:52

## 2023-03-15 RX ADMIN — CITALOPRAM HYDROBROMIDE 20 MG: 20 TABLET ORAL at 09:05

## 2023-03-15 RX ADMIN — CEFAZOLIN SODIUM 2 G: 2 INJECTION, SOLUTION INTRAVENOUS at 04:49

## 2023-03-15 RX ADMIN — HYDROCODONE BITARTRATE AND ACETAMINOPHEN 1 TABLET: 5; 325 TABLET ORAL at 01:52

## 2023-03-15 NOTE — PLAN OF CARE
Goal Outcome Evaluation:           Progress: improving   VSS, on RA while awake, CPAP when sleeping. Voiding well. Pain controlled with PO meds. ANGELY output= 120ml during shift. Moderate drainage on dressing otherwise intact. No c/o numbness/tingling. Ambulated in [place] with no difficulties. Slept some.

## 2023-03-15 NOTE — PLAN OF CARE
Goal Outcome Evaluation:  Plan of Care Reviewed With: patient, daughter           Outcome Evaluation: Pt. presents below baseline function w/increased pain affecting his ability to safely participate in functional mobility. He performed bed mobility, transfers, ambulated 350' and navigated 2 steps w/stand by assist. Educated pt. spinal precautions, log roll sequencing and home exercise program. Pt. would benefit from IPPT to address stated deficits. Recommend home with assist upon discharge.

## 2023-03-15 NOTE — OP NOTE
Operation note      Preoperative diagnosis   Severe spinal stenosis L3-4, L4-5      Postoperative diagnosis same    Procedures performed   1.  L4 complete laminectomy  2.  L3 partial laminectomy 50% of vertebral body  3.  Bilateral laminoforaminotomy L3-4 L4-5  4.  Resection of midline dorsal synovial cyst L3-4    Surgeon: Jerry Vieira MD     Assistants: MONIK I-Hunt my physician's assistant was present and personally scrubbed the entirety of the procedure, they assisted suctioning, retraction, approach, and closure of the case  Anesthesia: Normal endotracheal anesthesia    ASA Class: 2    Synovial cyst resection L3-4  Complications none    10 cc blood loss microscope used    Procedure in detail after formal written consent was obtained the patient was taken to the operating room endotracheally intubated given preoperative antimicrobial prophylaxis they were prepped and draped in the usual sterile manner all bony prominences and genitalia were padded to prevent neurologic injury.    At this point in time the patient was given local anesthesia at the operative level fluoroscopic guidance confirmed as 45 the correct level and Melo retractor system was placed on the lamina facet complex to ensure adequate exposure.    At this point time the loupe magnification was used to visualize the exposure a complete L4 laminectomy, 50% of the L3 lamina body was removed, and partial medial facetectomy was performed at the L3-4 and L4-5 areas.  Yellow ligament and lateral gutters were cleared free a synovial cyst was resected around the left L3-4 area there was somewhat adherent to the dura but there is no evidence of CSF leakage    A ball probe was placed through all the neural foramina very well decompressed and meticulous hemostasis maintained a ANGELY drain was placed  At the resolution the case meticulous hemostasis was maintained and in the incision was closed in layers I was present personally performed the entirety  of the procedure until the skin closure.

## 2023-03-15 NOTE — PLAN OF CARE
Goal Outcome Evaluation:              Outcome Evaluation: Patient ambulating with minimal/standby assistance, A&Ox4, no adventitious findings noted upon primary assessment. RA no SOB reported, P drained 70ml. Discharge order recieved, will continue to monitor and follow patient through discharge process per me order.    Dressing dry and intact.

## 2023-03-15 NOTE — PLAN OF CARE
Goal Outcome Evaluation:  Plan of Care Reviewed With: patient, daughter        Progress: improving  Outcome Evaluation: Pt presenting below baseline w/ ADL based tasks. Pt educated on spinal precautions in regards to ADLs/IADLs. OT issued, demonstrated and educated pt on use of AE for ADLs to maximize I and safety while limited by spinal precautions. SUA for LB dressing w/ use of AE. Recommend home w/ assist when medically appropriate.

## 2023-03-15 NOTE — THERAPY EVALUATION
Patient Name: Bolivar Caldwell  : 1955    MRN: [id number]                              Today's Date: 3/15/2023       Admit Date: 3/14/2023    Visit Dx:     ICD-10-CM ICD-9-CM   1. 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, ear   • Carpal tunnel syndrome     bilateral   • Cervical disc disorder 2022   • Claustrophobia Always   • GERD (gastroesophageal reflux disease)    • Agusto's disease    • Low back pain Years   • Lumbosacral disc disease Years ago   • MALDONADO on CPAP     CPAP set to 10   • PONV (postoperative nausea and vomiting)    • Sciatica associated with disorder of lumbar spine 2023   • Wears reading eyeglasses      Past Surgical History:   Procedure Laterality Date   • COLONOSCOPY     • ENDOSCOPY     • FOOT SURGERY Left    • HAND SURGERY Right    • KNEE SURGERY Bilateral     meniscusX 3   • MASTOID SURGERY Right    • REPLACEMENT TOTAL KNEE Left    • SHOULDER SURGERY Bilateral       General Information     Row Name 03/15/23 1321          OT Time and Intention    Document Type evaluation  -MR     Mode of Treatment occupational therapy  -MR     Row Name 03/15/23 1321          General Information    Patient Profile Reviewed yes  -MR     Prior Level of Function independent:;all household mobility;community mobility;gait;transfer;driving;work;bed mobility;ADL's  PTA pt was I w/ ADLs,  -MR     Existing Precautions/Restrictions spinal;other (see comments)  ANGELY drain  -MR     Barriers to Rehab none identified  -MR     Row Name 03/15/23 1321          Living Environment    People in Home spouse  -MR     Row Name 03/15/23 1321          Home Main Entrance    Number of Stairs, Main Entrance two   -MR     Stair Railings, Main Entrance none  -MR     Row Name 03/15/23 1321          Stairs Within Home, Primary    Number of Stairs, Within Home, Primary none  -MR     Row Name 03/15/23 1321          Cognition    Orientation Status (Cognition) oriented x 4  -MR           User Key  (r) = Recorded By, (t) = Taken By, (c) = Cosigned By    Initials Name Provider Type     Brandi Simms, OT Occupational Therapist                 Mobility/ADL's     Row Name 03/15/23 1415          Bed Mobility    Comment, (Bed Mobility) Received standing in room w/ PT  -MR     Row Name 03/15/23 1415          Transfers    Transfers sit-stand transfer  -MR     Row Name 03/15/23 1415          Sit-Stand Transfer    Sit-Stand [place] (Transfers) supervision;verbal cues  -     Row Name 03/15/23 1415          Functional Mobility    Functional Mobility- Ind. Level contact guard assist;verbal cues required  -     Functional Mobility-Distance (Feet) --  HH distances w/in pt's room  -     Row Name 03/15/23 1415          Activities of Daily Living    BADL Assessment/Intervention lower body dressing;bathing;toileting  -     Row Name 03/15/23 1415          Lower Body Dressing Assessment/Training    [place] Level (Lower Body Dressing) don;doff;socks;set up  -MR     Assistive Devices (Lower Body Dressing) long-handled shoe horn;reacher;sock-aid  -MR     Position (Lower Body Dressing) supported sitting  -MR     Comment, (Lower Body Dressing) OT issued, educated and demonstrated LB dressing w/ use of AE for increased independence w/ LB dressing while limited by spinal precautions.  -     Row Name 03/15/23 1415          Bathing Assessment/Intervention    [place] Level (Bathing) bathing skills  -MR     Assistive Devices (Bathing) long-handled sponge  -MR     Comment, (Bathing) OT issued, educated and demonstrated use of long handled sponge for maximum independence w/ bathing.  -     Row Name 03/15/23 1415          Toileting  Assessment/Training    [place] Level (Toileting) toileting skills  -MR     Assistive Devices (Toileting) toilet paper aid  -MR     Comment, (Toileting) OT issued, demonstrated and educated pt on use of toilet paper aide to maximize independence and safety w/ toileting while limited by spinal precautions.  -MR           User Key  (r) = Recorded By, (t) = Taken By, (c) = Cosigned By    Initials Name Provider Type     MendezBrandi, OT Occupational Therapist               Obj/Interventions     Row Name 03/15/23 1418          Sensory Assessment (Somatosensory)    Sensory Assessment (Somatosensory) UE sensation intact  -MR     Row Name 03/15/23 1418          Vision Assessment/Intervention    Visual Impairment/Limitations WFL;corrective lenses for reading  -MR     Row Name 03/15/23 1418          Range of Motion Comprehensive    General Range of Motion bilateral upper extremity ROM WFL  -MR     Row Name 03/15/23 1418          Strength Comprehensive (MMT)    General Manual Muscle Testing (MMT) Assessment no strength deficits identified  -MR     Comment, General Manual Muscle Testing (MMT) Assessment Did not formally assess d/t back pain.  -MR     Row Name 03/15/23 1418          Balance    Balance Assessment sitting static balance;sitting dynamic balance;standing static balance;standing dynamic balance  -MR     Static Sitting Balance independent  -MR     Dynamic Sitting Balance independent  -MR     Position, Sitting Balance unsupported;sitting in chair  -MR     Static Standing Balance supervision  -MR     Dynamic Standing Balance supervision  -MR     Position/Device Used, Standing Balance unsupported  -MR     Balance Interventions sitting;standing;sit to stand;supported;static;dynamic;minimal challenge;occupation based/functional task  -MR     Comment, Balance No overt LOB noted w/ mobility, transfers and ADLs.  -MR           User Key  (r) = Recorded By, (t) = Taken By, (c) = Cosigned By    Initials Name Provider  Type     Brandi Simms, OT Occupational Therapist               Goals/Plan     Row Name 03/15/23 1424          Bed Mobility Goal 1 (OT)    Activity/Assistive Device (Bed Mobility Goal 1, OT) rolling to left;rolling to right;sidelying to sit/sit to sidelying  -MR     [place] Level/Cues Needed (Bed Mobility Goal 1, OT) supervision required  -MR     Time Frame (Bed Mobility Goal 1, OT) long term goal (LTG);by discharge  -MR     Progress/Outcomes (Bed Mobility Goal 1, OT) new goal  -MR     Row Name 03/15/23 1424          Transfer Goal 1 (OT)    Activity/Assistive Device (Transfer Goal 1, OT) sit-to-stand/stand-to-sit;toilet  -MR     [place] Level/Cues Needed (Transfer Goal 1, OT) contact guard required;tactile cues required;verbal cues required  -MR     Time Frame (Transfer Goal 1, OT) long term goal (LTG);by discharge  -MR     Progress/Outcome (Transfer Goal 1, OT) new goal  -MR     Row Name 03/15/23 1424          Dressing Goal 1 (OT)    Activity/Device (Dressing Goal 1, OT) lower body dressing;long-handled shoe horn;reacher;sock-aid  -MR     [place]/Cues Needed (Dressing Goal 1, OT) modified independence  -MR     Time Frame (Dressing Goal 1, OT) long term goal (LTG);by discharge  -MR     Progress/Outcome (Dressing Goal 1, OT) new goal  -MR     Row Name 03/15/23 1424          Therapy Assessment/Plan (OT)    Planned Therapy Interventions (OT) activity tolerance training;adaptive equipment training;BADL retraining;functional balance retraining;IADL retraining;occupation/activity based interventions;patient/caregiver education/training;transfer/mobility retraining;strengthening exercise;ROM/therapeutic exercise  -MR           User Key  (r) = Recorded By, (t) = Taken By, (c) = Cosigned By    Initials Name Provider Type     Brandi Simms, OT Occupational Therapist               Clinical Impression     Row Name 03/15/23 1419          Pain Assessment    Pretreatment Pain Rating 3/10  -MR     Posttreatment  Pain Rating 3/10  -MR     Pain Location - Side/Orientation Bilateral  -MR     Pain Location incisional  -MR     Pain Location - back  -MR     Pain Intervention(s) Ambulation/increased activity;Repositioned  -MR     Row Name 03/15/23 1419          Plan of Care Review    Plan of Care Reviewed With patient;daughter  -MR     Progress improving  -MR     Outcome Evaluation Pt presenting below baseline w/ ADL based tasks. Pt educated on spinal precautions in regards to ADLs/IADLs. OT issued, demonstrated and educated pt on use of AE for ADLs to maximize I and safety while limited by spinal precautions. SUA for LB dressing w/ use of AE. Recommend home w/ assist when medically appropriate.  -MR     Row Name 03/15/23 1419          Therapy Assessment/Plan (OT)    Patient/Family Therapy Goal Statement (OT) Return to PLOF  -MR     Rehab Potential (OT) good, to achieve stated therapy goals  -MR     Criteria for Skilled Therapeutic Interventions Met (OT) yes;meets criteria;skilled treatment is necessary  -MR     Therapy Frequency (OT) daily  -MR     Row Name 03/15/23 1419          Therapy Plan Review/Discharge Plan (OT)    Anticipated Discharge Disposition (OT) home with assist  -     Row Name 03/15/23 1419          Vital Signs    Pre Systolic BP Rehab 120  -MR     Pre Treatment Diastolic BP 71  -MR     O2 Delivery Pre Treatment room air  -MR     O2 Delivery Intra Treatment room air  -MR     O2 Delivery Post Treatment room air  -MR     Pre Patient Position Standing  -MR     Intra Patient Position Standing  -MR     Post Patient Position Sitting  -MR     Row Name 03/15/23 1419          Positioning and Restraints    Pre-Treatment Position standing in room  -MR     Post Treatment Position chair  -MR     In Chair notified nsg;reclined;sitting;call light within reach;encouraged to call for assist;with family/caregiver;legs elevated;waffle cushion  -MR           User Key  (r) = Recorded By, (t) = Taken By, (c) = Cosigned By    Initials  Name Provider Type    Brandi Wheeler, OT Occupational Therapist               Outcome Measures     Row Name 03/15/23 1425          How much help from another is currently needed...    Putting on and taking off regular lower body clothing? 3  -MR     Bathing (including washing, rinsing, and drying) 3  -MR     Toileting (which includes using toilet bed pan or urinal) 3  -MR     Putting on and taking off regular upper body clothing 3  -MR     Taking care of personal grooming (such as brushing teeth) 4  -MR     Eating meals 4  -MR     AM-PAC 6 Clicks Score (OT) 20  -MR     Row Name 03/15/23 1425          Functional Assessment    Outcome Measure Options AM-PAC 6 Clicks Daily Activity (OT)  -MR           User Key  (r) = Recorded By, (t) = Taken By, (c) = Cosigned By    Initials Name Provider Type    Brandi Wheeler, OT Occupational Therapist                Occupational Therapy Education     Title: PT OT SLP Therapies (In Progress)     Topic: Occupational Therapy (Done)     Point: ADL training (Done)     Description:   Instruct learner(s) on proper safety adaptation and remediation techniques during self care or transfers.   Instruct in proper use of assistive devices.              Learning Progress Summary           Patient Acceptance, E, VU by MR at 3/15/2023 1425   Family Acceptance, E, VU by MR at 3/15/2023 1425                   Point: Home exercise program (Done)     Description:   Instruct learner(s) on appropriate technique for monitoring, assisting and/or progressing therapeutic exercises/activities.              Learning Progress Summary           Patient Acceptance, E, VU by MR at 3/15/2023 1425   Family Acceptance, E, VU by MR at 3/15/2023 1425                   Point: Precautions (Done)     Description:   Instruct learner(s) on prescribed precautions during self-care and functional transfers.              Learning Progress Summary           Patient Acceptance, E, VU by MR at 3/15/2023 1425   Family  Acceptance, E, VU by MR at 3/15/2023 1425                   Point: Body mechanics (Done)     Description:   Instruct learner(s) on proper positioning and spine alignment during self-care, functional mobility activities and/or exercises.              Learning Progress Summary           Patient Acceptance, E, VU by MR at 3/15/2023 1425   Family Acceptance, E, VU by MR at 3/15/2023 1425                               User Key     Initials Effective Dates Name Provider Type Discipline    MR 09/22/22 -  Brandi Simms OT Occupational Therapist OT              OT Recommendation and Plan  Planned Therapy Interventions (OT): activity tolerance training, adaptive equipment training, BADL retraining, functional balance retraining, IADL retraining, occupation/activity based interventions, patient/caregiver education/training, transfer/mobility retraining, strengthening exercise, ROM/therapeutic exercise  Therapy Frequency (OT): daily  Plan of Care Review  Plan of Care Reviewed With: patient, daughter  Progress: improving  Outcome Evaluation: Pt presenting below baseline w/ ADL based tasks. Pt educated on spinal precautions in regards to ADLs/IADLs. OT issued, demonstrated and educated pt on use of AE for ADLs to maximize I and safety while limited by spinal precautions. SUA for LB dressing w/ use of AE. Recommend home w/ assist when medically appropriate.     Time Calculation:    Time Calculation- OT     Row Name 03/15/23 1426             Time Calculation- OT    OT Start Time 1033  -MR      OT Received On 03/15/23  -MR      OT Goal Re-Cert Due Date 03/25/23  -MR         Untimed Charges    OT Eval/Re-eval Minutes 46  -MR         Total Minutes    Untimed Charges Total Minutes 46  -MR       Total Minutes 46  -MR            User Key  (r) = Recorded By, (t) = Taken By, (c) = Cosigned By    Initials Name Provider Type    MR Brandi Simms OT Occupational Therapist              Therapy Charges for Today     Code Description Service  Date Service Provider Modifiers Qty    [id number] HC OT EVAL LOW COMPLEXITY 4 3/15/2023 Brandi Simms OT GO 1               Brandi Simms OT  3/15/2023

## 2023-03-15 NOTE — THERAPY EVALUATION
Patient Name: Bolivar Caldwell  : 1955    MRN: [id number]                              Today's Date: 3/15/2023       Admit Date: 3/14/2023    Visit Dx:     ICD-10-CM ICD-9-CM   1. 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, ear   • Carpal tunnel syndrome     bilateral   • Cervical disc disorder 2022   • Claustrophobia Always   • GERD (gastroesophageal reflux disease)    • Agusto's disease    • Low back pain Years   • Lumbosacral disc disease Years ago   • MALDONADO on CPAP     CPAP set to 10   • PONV (postoperative nausea and vomiting)    • Sciatica associated with disorder of lumbar spine 2023   • Wears reading eyeglasses      Past Surgical History:   Procedure Laterality Date   • COLONOSCOPY     • ENDOSCOPY     • FOOT SURGERY Left    • HAND SURGERY Right    • KNEE SURGERY Bilateral     meniscusX 3   • MASTOID SURGERY Right    • REPLACEMENT TOTAL KNEE Left    • SHOULDER SURGERY Bilateral       General Information     Row Name 03/15/23 1541          Physical Therapy Time and Intention    Document Type evaluation  -SS     Mode of Treatment physical therapy  -SS     Row Name 03/15/23 1541          General Information    Patient Profile Reviewed yes  -SS     Prior Level of Function independent:;all household mobility;community mobility;transfer;gait;bed mobility;driving;work  activity limited by pain  -SS     Existing Precautions/Restrictions spinal;other (see comments)  ANGELY drain  -     Barriers to Rehab none identified  -SS     Row Name 03/15/23 1541          Living Environment    People in Home spouse  -SS     Row Name 03/15/23 1541          Home Main Entrance    Number of Stairs, Main Entrance  two  -SS     Stair Railings, Main Entrance none  -SS     Row Name 03/15/23 1541          Stairs Within Home, Primary    Number of Stairs, Within Home, Primary none  -SS     Row Name 03/15/23 1541          Cognition    Orientation Status (Cognition) oriented x 4  -SS     Row Name 03/15/23 1541          Safety Issues, Functional Mobility    Safety Issues Affecting Function (Mobility) safety precaution awareness;safety precautions follow-through/compliance;sequencing abilities  -     Impairments Affecting Function (Mobility) postural/trunk control;range of motion (ROM)  -           User Key  (r) = Recorded By, (t) = Taken By, (c) = Cosigned By    Initials Name Provider Type     Tawnya Dc PT Physical Therapist               Mobility     Row Name 03/15/23 1541          Bed Mobility    Bed Mobility rolling left;sidelying-sit  -     Rolling Left [place] (Bed Mobility) standby assist;verbal cues  -     Sidelying-Sit [place] (Bed Mobility) standby assist;verbal cues  -     Assistive Device (Bed Mobility) head of bed elevated  pt does have elevating bed at home  -     Comment, (Bed Mobility) VC for log roll sequencing  -     Row Name 03/15/23 1541          Sit-Stand Transfer    Sit-Stand [place] (Transfers) verbal cues;standby assist  -     Comment, (Sit-Stand Transfer) VC for hand placement, emphasis on lowering with eccentric control  -     Row Name 03/15/23 1541          Gait/Stairs (Locomotion)    [place] Level (Gait) standby assist;verbal cues  -     Assistive Device (Gait) other (see comments)  none  -SS     Distance in Feet (Gait) 350  -     Deviations/Abnormal Patterns (Gait) bilateral deviations  -     Bilateral Gait Deviations forward flexed posture;heel strike decreased  -     [place] Level (Stairs) contact guard;verbal cues  -     Handrail Location (Stairs) none  -SS     Number of Steps (Stairs) 2  -SS     Ascending Technique (Stairs) step-to-step  -SS      Descending Technique (Stairs) step-to-step  -     Comment, (Gait/Stairs) Pt. ambulated with a step through gait pattern. VC for upright posture. He performed 2 steps w/no assistive device or handrails. VC for safety recommendations. Activity limited by fatigue.  -           User Key  (r) = Recorded By, (t) = Taken By, (c) = Cosigned By    Initials Name Provider Type     Tawnya Dc, JEROME Physical Therapist               Obj/Interventions     Row Name 03/15/23 1545          Range of Motion Comprehensive    General Range of Motion bilateral lower extremity ROM WFL  -[facility] Name 03/15/23 1545          Strength Comprehensive (MMT)    Comment, General Manual Muscle Testing (MMT) Assessment BLE gross 4/5 per functional mobility  -     Row Name 03/15/23 1545          Motor Skills    Therapeutic Exercise shoulder;hip;knee;ankle;other (see comments)  abd bracing, bent knee fall outs x 10 each  -     Row Name 03/15/23 1545          Shoulder (Therapeutic Exercise)    Shoulder (Therapeutic Exercise) AROM (active range of motion)  -     Shoulder AROM (Therapeutic Exercise) bilateral;flexion;10 repetitions  -[facility] Name 03/15/23 1545          Hip (Therapeutic Exercise)    Hip (Therapeutic Exercise) AROM (active range of motion);isometric exercises  -     Hip AROM (Therapeutic Exercise) bilateral;external rotation;internal rotation;10 repetitions  -     Hip Isometrics (Therapeutic Exercise) bilateral;gluteal sets;10 repetitions  -     Row Name 03/15/23 1545          Knee (Therapeutic Exercise)    Knee (Therapeutic Exercise) AROM (active range of motion);isometric exercises  -     Knee AROM (Therapeutic Exercise) bilateral;heel slides;10 repetitions  -     Knee Isometrics (Therapeutic Exercise) bilateral;quad sets;10 repetitions  -     Row Name 03/15/23 1545          Ankle (Therapeutic Exercise)    Ankle (Therapeutic Exercise) AROM (active range of motion)  -     Ankle AROM (Therapeutic  Exercise) bilateral;dorsiflexion;plantarflexion;10 repetitions  -SS     Row Name 03/15/23 1540          Balance    Balance Assessment sitting static balance;sitting dynamic balance;sit to stand dynamic balance;standing static balance;standing dynamic balance  -SS     Static Sitting Balance independent  -SS     Dynamic Sitting Balance independent  -SS     Position, Sitting Balance unsupported;sitting in chair  -SS     Sit to Stand Dynamic Balance standby assist  -SS     Static Standing Balance supervision  -SS     Dynamic Standing Balance standby assist  -SS     Position/Device Used, Standing Balance unsupported  -SS     Balance Interventions sitting;standing;sit to stand;supported;static;dynamic  -SS     Comment, Balance no instability noted  -     Row Name 03/15/23 1544          Sensory Assessment (Somatosensory)    Sensory Assessment (Somatosensory) LE sensation intact  symmetrical  -SS           User Key  (r) = Recorded By, (t) = Taken By, (c) = Cosigned By    Initials Name Provider Type    SS Tawnya Dc, PT Physical Therapist               Goals/Plan     Row Name 03/15/23 9410          Bed Mobility Goal 1 (PT)    Activity/Assistive Device (Bed Mobility Goal 1, PT) bed mobility activities, all  -SS     [place] Level/Cues Needed (Bed Mobility Goal 1, PT) independent  -SS     Time Frame (Bed Mobility Goal 1, PT) long term goal (LTG);5 days  -     Strategies/Barriers (Bed Mobility Goal 1, PT) log roll sequencing  -     Row Name 03/15/23 9710          Transfer Goal 1 (PT)    Activity/Assistive Device (Transfer Goal 1, PT) sit-to-stand/stand-to-sit;bed-to-chair/chair-to-bed  -SS     [place] Level/Cues Needed (Transfer Goal 1, PT) independent  -SS     Time Frame (Transfer Goal 1, PT) long term goal (LTG);5 days  -     Row Name 03/15/23 6360          Gait Training Goal 1 (PT)    Activity/Assistive Device (Gait Training Goal 1, PT) gait (walking locomotion)  -SS     [place] Level (Gait Training  Goal 1, PT) independent  -SS     Distance (Gait Training Goal 1, PT) 500  -SS     Time Frame (Gait Training Goal 1, PT) long term goal (LTG);3 days  -     Row Name 03/15/23 1550          Stairs Goal 1 (PT)    Activity/Assistive Device (Stairs Goal 1, PT) ascending stairs;descending stairs  -     [place] Level/Cues Needed (Stairs Goal 1, PT) independent  -SS     Number of Stairs (Stairs Goal 1, PT) 2  -SS     Time Frame (Stairs Goal 1, PT) long term goal (LTG);10 days  -     Row Name 03/15/23 1550          Therapy Assessment/Plan (PT)    Planned Therapy Interventions (PT) bed mobility training;gait training;home exercise program;lumbar stabilization;neuromuscular re-education;patient/family education;postural re-education;ROM (range of motion);stair training;strengthening;stretching;transfer training  -           User Key  (r) = Recorded By, (t) = Taken By, (c) = Cosigned By    Initials Name Provider Type     Tawnya Dc, PT Physical Therapist               Clinical Impression     Row Name 03/15/23 1433          Pain    Pretreatment Pain Rating 3/10  -     Posttreatment Pain Rating 3/10  -     Pain Location - Side/Orientation Bilateral  -     Pain Location incisional  -     Pain Location - back  -     Pain Intervention(s) Repositioned;Ambulation/increased activity;Elevated  -     Additional Documentation Pain Scale: Numbers Pre/Post-Treatment (Group)  -     Row Name 03/15/23 6510          Plan of Care Review    Plan of Care Reviewed With patient;daughter  -     Outcome Evaluation Pt. presents below baseline function w/increased pain affecting his ability to safely participate in functional mobility. He performed bed mobility, transfers, ambulated 350' and navigated 2 steps w/stand by assist. Educated pt. spinal precautions, log roll sequencing and home exercise program. Pt. would benefit from IPPT to address stated deficits. Recommend home with assist upon discharge.  -[facility]  Name 03/15/23 1547          Therapy Assessment/Plan (PT)    Rehab Potential (PT) good, to achieve stated therapy goals  -     Criteria for Skilled Interventions Met (PT) yes;meets criteria;skilled treatment is necessary  -     Therapy Frequency (PT) daily  -     Row Name 03/15/23 1547          Vital Signs    Pre Systolic BP Rehab 120  -SS     Pre Treatment Diastolic BP 71  -SS     Pre Patient Position Supine  -SS     Row Name 03/15/23 1547          Positioning and Restraints    Pre-Treatment Position in bed  -SS     Post Treatment Position chair  -SS     In Chair notified nsg;sitting;with OT  -SS           User Key  (r) = Recorded By, (t) = Taken By, (c) = Cosigned By    Initials Name Provider Type    SS Tawnya Dc, PT Physical Therapist               Outcome Measures     Row Name 03/15/23 1551          How much help from another person do you currently need...    Turning from your back to your side while in flat bed without using bedrails? 3  -SS     Moving from lying on back to sitting on the side of a flat bed without bedrails? 3  -SS     Moving to and from a bed to a chair (including a wheelchair)? 3  -SS     Standing up from a chair using your arms (e.g., wheelchair, bedside chair)? 3  -SS     Climbing 3-5 steps with a railing? 3  -SS     To walk in hospital room? 3  -SS     AM-PAC 6 Clicks Score (PT) 18  -SS     Highest level of mobility 6 --> Walked 10 steps or more  -     Row Name 03/15/23 1551 03/15/23 1425       Functional Assessment    Outcome Measure Options AM-PAC 6 Clicks Basic Mobility (PT)  - AM-PAC 6 Clicks Daily Activity (OT)  -MR          User Key  (r) = Recorded By, (t) = Taken By, (c) = Cosigned By    Initials Name Provider Type    SS Tawnya Dc, JEROME Physical Therapist    Brandi Wheeler, OT Occupational Therapist                             Physical Therapy Education     Title: PT OT SLP Therapies (Done)     Topic: Physical Therapy (Done)     Point: Mobility training (Done)      Learning Progress Summary           Patient Eager, E,H, VU,DU,NR by  at 3/15/2023 1551    Comment: Educated pt. safety/technique w/bed mobility, transfers, ambulation, stair navigation, HEP, PT POC, spinal precautions   Family Eager, E,H, VU,DU,NR by  at 3/15/2023 1551    Comment: Educated pt. safety/technique w/bed mobility, transfers, ambulation, stair navigation, HEP, PT POC, spinal precautions                   Point: Home exercise program (Done)     Learning Progress Summary           Patient Eager, E,H, VU,DU,NR by  at 3/15/2023 1551    Comment: Educated pt. safety/technique w/bed mobility, transfers, ambulation, stair navigation, HEP, PT POC, spinal precautions   Family Eager, E,H, VU,DU,NR by  at 3/15/2023 1551    Comment: Educated pt. safety/technique w/bed mobility, transfers, ambulation, stair navigation, HEP, PT POC, spinal precautions                   Point: Body mechanics (Done)     Learning Progress Summary           Patient Eager, E,H, VU,DU,NR by  at 3/15/2023 1551    Comment: Educated pt. safety/technique w/bed mobility, transfers, ambulation, stair navigation, HEP, PT POC, spinal precautions   Family Eager, E,H, VU,DU,NR by  at 3/15/2023 1551    Comment: Educated pt. safety/technique w/bed mobility, transfers, ambulation, stair navigation, HEP, PT POC, spinal precautions                   Point: Precautions (Done)     Learning Progress Summary           Patient Eager, E,H, VU,DU,NR by  at 3/15/2023 1551    Comment: Educated pt. safety/technique w/bed mobility, transfers, ambulation, stair navigation, HEP, PT POC, spinal precautions   Family Eager, E,H, VU,DU,NR by  at 3/15/2023 1551    Comment: Educated pt. safety/technique w/bed mobility, transfers, ambulation, stair navigation, HEP, PT POC, spinal precautions                               User Key     Initials Effective Dates Name Provider Type Discipline     06/01/21 -  Tawnya cD, PT Physical Therapist PT               PT Recommendation and Plan  Planned Therapy Interventions (PT): bed mobility training, gait training, home exercise program, lumbar stabilization, neuromuscular re-education, patient/family education, postural re-education, ROM (range of motion), stair training, strengthening, stretching, transfer training  Plan of Care Reviewed With: patient, daughter  Outcome Evaluation: Pt. presents below baseline function w/increased pain affecting his ability to safely participate in functional mobility. He performed bed mobility, transfers, ambulated 350' and navigated 2 steps w/stand by assist. Educated pt. spinal precautions, log roll sequencing and home exercise program. Pt. would benefit from IPPT to address stated deficits. Recommend home with assist upon discharge.     Time Calculation:    PT Charges     Row Name 03/15/23 1552             Time Calculation    Start Time 1003  -SS      Stop Time 1032  -SS      Time Calculation (min) 29 min  -SS      PT Received On 03/15/23  -SS      PT Goal Re-Cert Due Date 03/25/23  -SS         Time Calculation- PT    Total Timed Code Minutes- PT 29 minute(s)  -SS         Timed Charges    10333 - Gait Training Minutes  8  -SS         Untimed Charges    PT Eval/Re-eval Minutes 50  -SS         Total Minutes    Timed Charges Total Minutes 8  -SS      Untimed Charges Total Minutes 50  -SS       Total Minutes 58  -SS            User Key  (r) = Recorded By, (t) = Taken By, (c) = Cosigned By    Initials Name Provider Type     Tawnya Dc, PT Physical Therapist              Therapy Charges for Today     Code Description Service Date Service Provider Modifiers Qty    80005249090 HC GAIT TRAINING EA 15 MIN 3/15/2023 Tawnya Dc, PT GP 1    85183421191 HC PT EVAL LOW COMPLEXITY 4 3/15/2023 Tawnya Dc, PT GP 1          PT G-Codes  Outcome Measure Options: AM-PAC 6 Clicks Basic Mobility (PT)  AM-PAC 6 Clicks Score (PT): 18  AM-PAC 6 Clicks Score (OT): 20  PT Discharge  Summary  Anticipated Discharge Disposition (PT): home with assist    Tawnya Dc, PT  3/15/2023

## 2023-03-27 NOTE — DISCHARGE SUMMARY
DISCHARGE SUMMARY  PATIENT:  CINDY CALDWELL  YOB: 1955  VISIT ID:  [id number]  PRIMARY CARE:  Helio Velazquez MD  ADMITTING PHYSICIAN:  Dana att. 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 Caldwell is a 67 y.o. male known to the neurosurgical practice for having neurogenic claudication.  Patient deemed surgical candidate by Dr. Jerry Vieira taken on 3/14/2023 to the OR for L3-4 laminectomy with L3-4 and L4-5 foraminotomies bilaterally.  Procedure without event patient was admitted to the floor for observation pain control.  Postoperatively patient was ambulate take food by mouth voiding appropriately.  Postop day 1 he was discharged in stable and satisfactory condition    DISCHARGE MEDICATIONS:     Discharge Medications      Continue These Medications      Instructions Start Date   cetirizine 10 MG tablet  Commonly known as: zyrTEC   10 mg, Oral, Daily      citalopram 20 MG tablet  Commonly known as: CeleXA   20 mg, Oral, Daily      esomeprazole 40 MG capsule  Commonly known as: nexIUM   40 mg, Oral, 2 Times Daily      meloxicam 15 MG tablet  Commonly known as: MOBIC   15 mg, Oral, Daily       tiZANidine 4 MG tablet  Commonly known as: Zanaflex   4 mg, Oral, Every 8 Hours PRN      traMADol 50 MG tablet  Commonly known as: ULTRAM   50 mg, Oral, Every 6 Hours PRN         Stop These Medications    mupirocin 2 % nasal ointment  Commonly known as: BACTROBAN        ASK your doctor about these medications      Instructions Start Date   HYDROcodone-acetaminophen 5-325 MG per tablet  Commonly known as: NORCO  Ask about: Should I take this medication?   1 tablet, Oral, Every 8 Hours PRN               ACTIVITY:  No heavy lifting bending or twisting    DIET:  Normal    FOLLOW UP:       Follow-up Information     Helio Beauchamp PA-C Follow up in 2 week(s).    Specialties: Physician Assistant, Neurosurgery  Why: For wound re-check  Contact information:  [address]  [address] [address] [phone]             Helio Velazquez MD .    Specialty: Internal Medicine  Contact information:  [address] [facility] [address] [phone]

## 2023-04-03 ENCOUNTER — OFFICE VISIT (OUTPATIENT)
Dept: NEUROSURGERY | Facility: CLINIC | Age: 68
End: 2023-04-03
Payer: MEDICARE

## 2023-04-03 VITALS — HEIGHT: 68 IN | BODY MASS INDEX: 35.01 KG/M2 | RESPIRATION RATE: 18 BRPM | WEIGHT: 231 LBS

## 2023-04-03 DIAGNOSIS — Z98.890 S/P LUMBAR LAMINECTOMY: Primary | ICD-10-CM

## 2023-04-03 PROCEDURE — 99024 POSTOP FOLLOW-UP VISIT: CPT | Performed by: PHYSICIAN ASSISTANT

## 2023-04-03 NOTE — PROGRESS NOTES
Subjective   Patient ID: Bolivar Caldwell is a 67 y.o. male is here today for follow-up for wound check.    HPI:      Patient is a nice 67-year-old  known to the neurosurgical practice for having years of neurogenic claudication.  Patient was deemed surgical candidate by Dr. Jerry Vieira and taken to the OR on 3/14/2023 for an L3 laminectomy partial L4-5 decompression.     Procedure went without event and patient was discharged home postop day #1 in stable and satisfactory condition.    Patient is back today for a incision check.  Incision is clean dry no sign infection bleed erythema.  Patient is very pleased with postsurgical outcome thus far.    Patient does have some pain when he overdoes it but is much better than preop.    The following portions of the patient's history were reviewed and updated as appropriate: allergies, current medications, past family history, past medical history, past social history, past surgical history and problem list.    Review of Systems   Constitutional: Negative for activity change, appetite change, chills, diaphoresis, fatigue, fever and unexpected weight change.   HENT: Negative for congestion, dental problem, drooling, ear discharge, ear pain, facial swelling, hearing loss, mouth sores, nosebleeds, postnasal drip, rhinorrhea, sinus pressure, sneezing, sore throat, tinnitus, trouble swallowing and voice change.    Eyes: Negative for photophobia, pain, discharge, redness, itching and visual disturbance.   Respiratory: Negative for apnea, cough, choking, chest tightness, shortness of breath, wheezing and stridor.    Cardiovascular: Negative for chest pain, palpitations and leg swelling.   Gastrointestinal: Negative for abdominal distention, abdominal pain, anal bleeding, blood in stool, constipation, diarrhea, nausea, rectal pain and vomiting.   Endocrine: Negative for cold intolerance, heat intolerance, polydipsia, polyphagia and polyuria.   Genitourinary:  "Negative for decreased urine volume, difficulty urinating, dysuria, enuresis, flank pain, frequency, genital sores, hematuria and urgency.   Musculoskeletal: Positive for arthralgias. Negative for back pain, gait problem, joint swelling, myalgias, neck pain and neck stiffness.   Skin: Negative for color change, pallor, rash and wound.   Allergic/Immunologic: Negative for environmental allergies, food allergies and immunocompromised state.   Neurological: Negative for dizziness, tremors, seizures, syncope, facial asymmetry, speech difficulty, weakness, light-headedness, numbness and headaches.   Hematological: Negative for adenopathy. Does not bruise/bleed easily.   Psychiatric/Behavioral: Negative for agitation, behavioral problems, confusion, decreased concentration, dysphoric mood, hallucinations, self-injury, sleep disturbance and suicidal ideas. The patient is not nervous/anxious and is not hyperactive.    All other systems reviewed and are negative.        Objective    reports that he has never smoked. He has never used smokeless tobacco. He reports current alcohol use. He reports that he does not use drugs.   SMOKING STATUS: Non-smoker    Physical Exam:   Vitals:Resp 18   Ht 172.7 cm (67.99\")   Wt 105 kg (231 lb)   BMI 35.13 kg/m²    BMI: Body mass index is 35.13 kg/m².       Incision:   The incision is well-healed and well approximated.  No signs of infection, bleeding, or erythema.    Musculoskeletal:                                            Dorsiflexion is 5/5 Bilaterally                    Plantarflexion is 5/5 bilaterally                    Hip Flexion 5/5 bilaterally.                        Neurologic:                                         The patient is alert and oriented by 3.                       Sensation is equal bilaterally with no deficit.                        Reflexes:  2+ throughout       Assessment & Plan   Independent Review of Radiographic Studies:    No new films reviewed at this " visit  Medical Decision Making:    Patient is doing very well at this point.  Patient is pleased postsurgical outcome.  Incision is clean, dry.  No signs of infection, bleeding, or erythema.    The patient will follow-up in 6 weeks after completing physical therapy. The patient understands instructions and limitations for the best post-operative success.    At that televisit we will allow him to go back to work    It has been a pleasure providing neurosurgical care.    SARAH Schultz-C      Class 2 Severe Obesity (BMI >=35 and <=39.9). Obesity-related health conditions include the following: none. Obesity is unchanged. BMI is is above average; BMI management plan is completed. We discussed portion control and increasing exercise.    Diagnoses and all orders for this visit:    1. S/P lumbar laminectomy (Primary)  -     Ambulatory Referral to Physical Therapy Evaluate and treat (PO lami), POST OP      Return in about 6 weeks (around 5/15/2023).

## 2023-05-02 ENCOUNTER — TELEPHONE (OUTPATIENT)
Dept: NEUROSURGERY | Facility: CLINIC | Age: 68
End: 2023-05-02

## 2023-05-02 NOTE — TELEPHONE ENCOUNTER
Provider:  Dariel  Surgery/Procedure:  Sciatica associated with disorder of lumbar spine  Surgery/Procedure Date:  3-  Last visit:   2-23-23  Next visit: 5-     Reason for call: Patient is wanting  to go back to work with no restrictions,he says that he feels fine and if this is ok with Dr. Vieira he would like a letter written saying it is ok.    Please See note below from the hub and advise. Thank you.

## 2023-05-02 NOTE — TELEPHONE ENCOUNTER
Caller: Bolivar Caldwell    Relationship to patient: Self    Best call back number: [phone]    Patient is needing: PATIENT CALLED, PATIENT IS REQUESTING A LETTER FOR WORK RELEASE WITH NO RESTRICTIONS. PATIENT STATES HE IS FEELING GREAT AND WOULD LIKE TO GET THE LETTER STARTED. PATIENT ALSO STATED [facility] DID NOT WANT HIM RETURNING UNTIL AT LEAST THE 16TH BUT IF [facility] THINKS OK, PATIENT STATES HE CAN GO BACK SOONER. PATIENT IS CURRENTLY SCHEDULED TO RETURN TO WORK 05/19/23. PLEASE CREATE NOTE AND EMAIL IT TO PATIENT, IF WE CANNOT EMAIL, MYCHART IS OK. PLEASE CALL THE PATIENT TO ADVISE IT HAS BEEN CREATED.    THANK YOU

## 2023-05-11 ENCOUNTER — TELEPHONE (OUTPATIENT)
Dept: NEUROSURGERY | Facility: CLINIC | Age: 68
End: 2023-05-11
Payer: MEDICARE

## 2023-05-11 NOTE — TELEPHONE ENCOUNTER
I have called and spoke to the patient and told him to keep his phone visit even though he is back to work.  This will be the second time he will be in touch with them since surgery.  He said ok he would talk to Helio at that time.  He was thankful for the call back.

## 2023-05-11 NOTE — TELEPHONE ENCOUNTER
Caller: Bolivar Caldwell    Relationship to patient: Self    Best call back number: [phone]    Patient is needing: PATIENT CALLED, PATIENT WOULD LIKE TO KNOW IF HE NEEDS TO KEEP HIS 05/18/23 TELEVISIT APPT. PLEASE CALL PATIENT TO ADVISE.    THANK YOU

## 2023-05-18 ENCOUNTER — OFFICE VISIT (OUTPATIENT)
Dept: NEUROSURGERY | Facility: CLINIC | Age: 68
End: 2023-05-18
Payer: MEDICARE

## 2023-05-18 DIAGNOSIS — Z98.890 S/P LUMBAR LAMINECTOMY: Primary | ICD-10-CM

## 2023-05-18 NOTE — PROGRESS NOTES
You have chosen to receive care through a telephone visit. Do you consent to use a telephone visit for your medical care today? 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  Patient was deemed surgical candidate by Dr. Jerry Vieira and taken to the OR on 3/14/2023 for an L3 laminectomy partial L4-5 decompression.     Patient is back to work and playing pickle ball.  Patient doing exceedingly well with minimal pain.    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 He has never used smokeless tobacco. He reports current alcohol use. He reports that he does not use drugs.   SMOKING STATUS: Non-smoker    Surgical History:     Past Surgical History:   Procedure Laterality Date   • COLONOSCOPY     • ENDOSCOPY     • FOOT SURGERY Left    • HAND SURGERY Right    • KNEE SURGERY Bilateral     meniscusX 3   • LUMBAR LAMINECTOMY DISCECTOMY DECOMPRESSION N/A 3/14/2023    Procedure: Lumbar laminectomy L3 partial, L4 complete  bilateral foraminotomies L3-4 L4-5;  Surgeon: Jerry Vieira MD;  [facility];  Service: Neurosurgery;  Laterality: N/A;   • MASTOID SURGERY Right    • REPLACEMENT TOTAL KNEE Left    • SHOULDER SURGERY Bilateral        Allergies:   Nubain [nalbuphine]    Physical Exam:    Vital Signs:There were no vitals taken for this visit.   BMI: There is no height or weight on file to calculate BMI.     Exam limited secondary to telephone encounter    Medical Decision Making    Data Review:     No new films reviewed at this visit  Diagnosis:   Status post laminectomy 3 L4    Treatment Options:   At this point the patient is doing very well.  The patient is pleased with postsurgical outcome.  With the resolution of pain, I believe that it is adequate to see the patient back on an as-needed basis.  The patient has been educated on reasons that would necessitate a referral back to us in a more urgent manner.  The patient understands of his instructions and limitations for the best post-operative success.    It has been a pleasure providing neurosurgical care.    Helio SMITH-C    Class 2 Severe Obesity (BMI >=35 and <=39.9). Obesity-related health conditions include the following: none. Obesity is unchanged. BMI is is above average; BMI management plan is completed. We discussed portion control and increasing exercise.     Diagnosis Plan   1. S/P lumbar laminectomy

## 2025-04-16 ENCOUNTER — TRANSCRIBE ORDERS (OUTPATIENT)
Dept: CARDIOLOGY | Facility: CLINIC | Age: 70
End: 2025-04-16
Payer: MEDICARE

## 2025-04-16 DIAGNOSIS — R53.83 FATIGUE, UNSPECIFIED TYPE: Primary | ICD-10-CM

## 2025-04-17 ENCOUNTER — OFFICE VISIT (OUTPATIENT)
Dept: CARDIOLOGY | Facility: CLINIC | Age: 70
End: 2025-04-17
Payer: MEDICARE

## 2025-04-17 VITALS
OXYGEN SATURATION: 98 % | SYSTOLIC BLOOD PRESSURE: 130 MMHG | DIASTOLIC BLOOD PRESSURE: 82 MMHG | HEART RATE: 87 BPM | BODY MASS INDEX: 34.86 KG/M2 | RESPIRATION RATE: 18 BRPM | WEIGHT: 230 LBS | HEIGHT: 68 IN

## 2025-04-17 DIAGNOSIS — E78.2 MIXED HYPERLIPIDEMIA: ICD-10-CM

## 2025-04-17 DIAGNOSIS — I10 PRIMARY HYPERTENSION: ICD-10-CM

## 2025-04-17 DIAGNOSIS — R06.09 DYSPNEA ON EXERTION: Primary | ICD-10-CM

## 2025-04-17 RX ORDER — MIRTAZAPINE 15 MG/1
15 TABLET, FILM COATED ORAL NIGHTLY
COMMUNITY
Start: 2025-03-17

## 2025-04-17 NOTE — ASSESSMENT & PLAN NOTE
Dyspnea on exertion with significant limitations, for the last few weeks.  Playing [employer] ball did not seem to bother him.  Blood pressure borderline controlled.  Lipid profile markedly abnormal.  Significant deconditioning.   Plan:  Transthoracic echocardiogram for assessment of structural heart disease  Treadmill exercise stress test for dyspnea on exertion for the diagnosis of coronary artery disease, also workup of stress limitations  Aggressive risk factor modification

## 2025-04-17 NOTE — ASSESSMENT & PLAN NOTE
Lipid abnormalities are newly identified    Plan:  Lipid lowering therapy not prescribed due to lifestyle changes    Discussed medication dosage, use, side effects, and goals of treatment in detail.    Counseled patient on lifestyle modifications to help control hyperlipidemia.   Cholesterol lowering dietary information shared with patient.  Advised patient to exercise for 150 minutes weekly. (30 minute brisk walk, 5 days a week for example)  Weight Loss encouraged  Patient counseled in regards to heart healthy, low fat/ low cholesterol/low sat diet, daily exercise for 30 minutes, low to moderate intensity, and weight loss.    Labs from 4/15/2025 total cholesterol 247, HDL 56, triglycerides 78,   LABORATORY - SCAN - LABS CBC, CMP, A1C, LIPID PANEL, TSH 4/15/25 [facility] (04/15/2025)     Goal of therapy: LDL  mg/dL      Followup in 6 months.   08-Mar-2022 21:15

## 2025-04-17 NOTE — PROGRESS NOTES
"[facility]  [facility] CARDIOLOGY  [address] [place] [address]  Dept: [phone]  Dept Fax: [phone]    Date: 04/17/2025  Patient: Bolivar Caldwell  YOB: 1955    New Patient Office Note    Consult Reason:  Mr. Bolivar Caldwell is a 69 y.o. male who presents to the clinic to establish care, seen for Fatigue.   Patient with no acute complaints.  For the last few weeks feeling tired, mostly at rest, gets better with exercise and activity, was playing [employer] ball earlier today with no shortness of breath or chest pain.  Patient with dyspnea on exertion mostly going up the stairs or up a steep hill, new compared to previous years.  Patient denies angina, orthopnea, PND, palpitations, lightheadedness, syncope or medications side-effects.  Patient \"has a lot going on\", was thinking about shelter, taking a toll on him.    The following portions of the patient's history were reviewed and updated as appropriate: allergies, current medications, past family history, past medical history, past social history, past surgical history, and problem list.    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, ear    Carpal tunnel syndrome     bilateral    Cervical disc disorder October2022    Claustrophobia Always    GERD (gastroesophageal reflux disease)     Walton's disease     Low back pain Years    Lumbosacral disc disease Years ago    Mixed hyperlipidemia 4/17/2025    MALDONADO on CPAP     CPAP set to 10    PONV (postoperative nausea and vomiting)     " "Primary hypertension 4/17/2025    Sciatica associated with disorder of lumbar spine 02/24/2023    Wears reading eyeglasses        Past Surgical History:   Procedure Laterality Date    COLONOSCOPY      ENDOSCOPY      FOOT SURGERY Left     HAND SURGERY Right     KNEE SURGERY Bilateral     meniscusX 3    LUMBAR LAMINECTOMY DISCECTOMY DECOMPRESSION N/A 3/14/2023    Procedure: Lumbar laminectomy L3 partial, L4 complete bilateral foraminotomies L3-4 L4-5;  Surgeon: Jerry Vieira MD;  [facility];  Service: Neurosurgery;  Laterality: N/A;    MASTOID SURGERY Right     REPLACEMENT TOTAL KNEE Left     SHOULDER SURGERY Bilateral        Family History   Problem Relation Age of Onset    Cancer Father         Social History     Socioeconomic History    Marital status:    Tobacco Use    Smoking status: Never    Smokeless tobacco: Never   Vaping Use    Vaping status: Never Used   Substance and Sexual Activity    Alcohol use: Yes     Comment: 1-2 X per month    Drug use: No    Sexual activity: Defer     Partners: Female       Objective  Vitals:    04/17/25 1258   BP: 130/82   Pulse: 87   Resp: 18   SpO2: 98%   Weight: 104 kg (230 lb)   Height: 172.7 cm (67.99\")   PainSc: 0-No pain     Vitals:    04/17/25 1258   BP: 130/82   Pulse: 87   Resp: 18   SpO2: 98%   Weight: 104 kg (230 lb)   Height: 172.7 cm (67.99\")        Physical Exam  Constitutional:       Appearance: Healthy appearance. Not in distress.   Eyes:      Pupils: Pupils are equal, round, and reactive to light.   HENT:    Mouth/Throat:      Mouth: Mucous membranes are moist.   Neck:      Vascular: No carotid bruit, hepatojugular reflux, JVD or JVR. JVD normal.   Pulmonary:      Effort: Pulmonary effort is normal.      Breath sounds: Normal breath sounds. No wheezing. No rhonchi. No rales.   Chest:      Chest wall: Not tender to palpatation.   Cardiovascular:      PMI at left midclavicular line. Normal rate. Regular rhythm. Normal S1 with normal " "intensity. Normal S2 with normal intensity.       Murmurs: There is no murmur.      No gallop.  No click. No rub.   Pulses:     Intact distal pulses.   Edema:     Peripheral edema absent.   Abdominal:      General: There is no abdominal bruit.   Skin:     General: Skin is warm.   Neurological:      Mental Status: Alert and oriented to person, place and time.              Labs:  Lab Results   Component Value Date     03/07/2023    K 4.4 03/07/2023     03/07/2023    CO2 24.0 03/07/2023    BUN 20 03/07/2023    CREATININE 0.97 03/07/2023    CALCIUM 9.0 03/07/2023    GLUCOSE 98 03/07/2023     Lab Results   Component Value Date    WBC 5.55 03/07/2023    HGB 13.0 03/07/2023    HCT 38.7 03/07/2023     03/07/2023     No results found for: \"APTT\", \"INR\", \"PTT\"  No results found for: \"CKTOTAL\", \"TROPONINI\", \"TROPONINT\", \"CKMBINDEX\", \"BNP\"  No results found for: \"BNP\", \"PROBNP\"    No results found for: \"CHLPL\", \"TRIG\", \"HDL\", \"LDL\"  No results found for: \"TSH\", \"FREET4\"    The ASCVD Risk score (Lagrange DK, et al., 2019) failed to calculate for the following reasons:    Cannot find a previous HDL lab    Cannot find a previous total cholesterol lab     [facility]:    ECG 12 Lead    Date/Time: 4/17/2025 1:40 PM  Performed by: Fortino Hendrix MD    Authorized by: Fortino Hendrix MD  Comparison: compared with previous ECG from 3/7/2023  Comparison to previous ECG: Nonspecific ST-T changes inferior leads  Rhythm: sinus rhythm  Other findings: non-specific ST-T wave changes  Comments: Normal sinus rhythm with nonspecific ST-T changes inferior leads        CXR: No results found for this or any previous visit.     ECHO/MUGA: No results found for this or any previous visit.     STRESS TESTS: No results found for this or any previous visit.     CARDIAC CATH: No results found for this or any previous visit.     DEVICES: No valid procedures specified.   HOLTER: No results found for this or any previous visit.     CT/MRI: "  No results found for this or any previous visit.    VASCULAR: No valid procedures specified.     Assessment and Plan  Diagnoses and all orders for this visit:    1. Dyspnea on exertion (Primary)  Assessment & Plan:  Dyspnea on exertion with significant limitations, for the last few weeks.  Playing pickle ball did not seem to bother him.  Blood pressure borderline controlled.  Lipid profile markedly abnormal.  Significant deconditioning.   Plan:  Transthoracic echocardiogram for assessment of structural heart disease  Treadmill exercise stress test for dyspnea on exertion for the diagnosis of coronary artery disease, also workup of stress limitations  Aggressive risk factor modification    Orders:  -     Treadmill Stress Test; Future  -     Adult Transthoracic Echo Complete W/ Cont if Necessary Per Protocol; Future  -     ECG 12 Lead    2. Primary hypertension  Assessment & Plan:  Patient has new onset Hypertension  Ambulatory BP monitoring  Dietary sodium restriction.  Recommended strategies for weight loss.  Counseled on importance of healthy eating and regular aerobic exercise  Advised to check BP regularly and call office if frequently > 130/80  Blood pressure will be reassessed in 6 months, patient will bring BP log in 1 to 2 weeks for his in office stress test and echo.    Orders:  -     ECG 12 Lead    3. Mixed hyperlipidemia  Assessment & Plan:   Lipid abnormalities are newly identified    Plan:  Lipid lowering therapy not prescribed due to lifestyle changes    Discussed medication dosage, use, side effects, and goals of treatment in detail.    Counseled patient on lifestyle modifications to help control hyperlipidemia.   Cholesterol lowering dietary information shared with patient.  Advised patient to exercise for 150 minutes weekly. (30 minute brisk walk, 5 days a week for example)  Weight Loss encouraged  Patient counseled in regards to heart healthy, low fat/ low cholesterol/low sat diet, daily exercise  for 30 minutes, low to moderate intensity, and weight loss.    Labs from 4/15/2025 total cholesterol 247, HDL 56, triglycerides 78,   LABORATORY - SCAN - LABS CBC, CMP, A1C, LIPID PANEL, TSH 4/15/25 [facility] (04/15/2025)     Goal of therapy: LDL  mg/dL      Followup in 6 months.           Return in about 6 months (around 10/17/2025) for Follow-up with Dr Hendrix.    There are no Patient Instructions on file for this visit.    Fortino Hendrix MD

## 2025-04-17 NOTE — ASSESSMENT & PLAN NOTE
Patient has new onset Hypertension  Ambulatory BP monitoring  Dietary sodium restriction.  Recommended strategies for weight loss.  Counseled on importance of healthy eating and regular aerobic exercise  Advised to check BP regularly and call office if frequently > 130/80  Blood pressure will be reassessed in 6 months, patient will bring BP log in 1 to 2 weeks for his in office stress test and echo.

## 2025-05-12 ENCOUNTER — TELEPHONE (OUTPATIENT)
Dept: CARDIOLOGY | Facility: CLINIC | Age: 70
End: 2025-05-12

## 2025-05-12 NOTE — TELEPHONE ENCOUNTER
Caller: Bolivar Caldwell    Relationship: Self    Best call back number: [phone]    What is the best time to reach you: ANYTIME    Who are you requesting to speak with (clinical staff, provider,  specific staff member): CLINICAL    What was the call regarding: PT WILL HAVE SURGERY FOR CARPAL TUNNEL AND LEFT SHOULDER SURGERY 5.28.25 AND WANTED TO KNOW IF HE WOULD STILL BE ABLE TO DO STRESS TEST 5.30.25. PLEASE CALL HIM WHEN AVAILABLE.

## 2025-06-22 ENCOUNTER — RESULTS FOLLOW-UP (OUTPATIENT)
Dept: CARDIOLOGY | Facility: CLINIC | Age: 70
End: 2025-06-22
Payer: MEDICARE

## (undated) DEVICE — PROXIMATE RH ROTATING HEAD SKIN STAPLERS (35 WIDE) CONTAINS 35 STAINLESS STEEL STAPLES: Brand: PROXIMATE

## (undated) DEVICE — APPL CHLORAPREP TINTED 26ML TEAL

## (undated) DEVICE — HDRST INTUB GENTLETOUCH SLOT 7IN RT

## (undated) DEVICE — SUT VIC 0 UR6 27IN VCP603H

## (undated) DEVICE — BLANKT WARM UPPR/BDY ARM/OUT 57X196CM

## (undated) DEVICE — ANTIBACTERIAL UNDYED BRAIDED (POLYGLACTIN 910), SYNTHETIC ABSORBABLE SUTURE: Brand: COATED VICRYL

## (undated) DEVICE — SPNG GZ WOVN 4X4IN 12PLY 10/BX STRL

## (undated) DEVICE — JP PERF DRN SIL FLT 10MM FULL: Brand: CARDINAL HEALTH

## (undated) DEVICE — NDL HYPO ECLPS SFTY 25G 1 1/2IN

## (undated) DEVICE — TOOL MR8-14BA60 MR8 14CM BALL 6MM: Brand: MIDAS REX MR8

## (undated) DEVICE — TOOL MR8-14MH30 MR8 14CM MATCH 3MM: Brand: MIDAS REX MR8

## (undated) DEVICE — GLV SURG PREMIERPRO MIC LTX PF SZ8 BRN

## (undated) DEVICE — STRIP,CLOSURE,WOUND,MEDI-STRIP,1/2X4: Brand: MEDLINE

## (undated) DEVICE — PATIENT RETURN ELECTRODE, SINGLE-USE, CONTACT QUALITY MONITORING, ADULT, WITH 9FT CORD, FOR PATIENTS WEIGING OVER 33LBS. (15KG): Brand: MEGADYNE

## (undated) DEVICE — ADHS LIQ MASTISOL 2/3ML

## (undated) DEVICE — PK NEURO DISC 10

## (undated) DEVICE — GLV SURG BIOGEL LTX PF 8

## (undated) DEVICE — C-ARM: Brand: UNBRANDED

## (undated) DEVICE — DISPOSABLE BIPOLAR FORCEPS 7 3/4" (19.7CM) SCOVILLE BAYONET, INSULATED, 1.5MM TIP AND 12 FT. (3.6M) CABLE: Brand: KIRWAN

## (undated) DEVICE — 3M™ STERI-DRAPE™ INSTRUMENT POUCH 1018: Brand: STERI-DRAPE™

## (undated) DEVICE — JACKSON-PRATT 100CC BULB RESERVOIR: Brand: CARDINAL HEALTH

## (undated) DEVICE — ELECTRD BLD EZ CLN MOD 6.5IN

## (undated) DEVICE — SYR CONTRL PRESS/LO FIX/M/LL W/THMB/RNG 10ML

## (undated) DEVICE — INTENDED USE FOR SURGICAL MARKING ON INTACT SKIN, ALSO PROVIDES A PERMANENT METHOD OF IDENTIFYING OBJECTS IN THE OPERATING ROOM: Brand: WRITESITE® REGULAR TIP SKIN MARKER

## (undated) DEVICE — Device

## (undated) DEVICE — GLV SURG PREMIERPRO MIC LTX PF SZ7 BRN

## (undated) DEVICE — NDL SPINE 22G 31/2IN BLK

## (undated) DEVICE — DRSNG WND GZ PAD BORDERED 4X8IN STRL

## (undated) DEVICE — PAD ARMBRD SURG CONVOL 7.5X20X2IN

## (undated) DEVICE — PENCL ROCKRSWCH MEGADYNE W/HOLSTR 10FT SS

## (undated) DEVICE — ELECTRD BLD EZ CLN MOD XLNG 2.75IN